# Patient Record
Sex: FEMALE | Race: OTHER | Employment: UNEMPLOYED | URBAN - METROPOLITAN AREA
[De-identification: names, ages, dates, MRNs, and addresses within clinical notes are randomized per-mention and may not be internally consistent; named-entity substitution may affect disease eponyms.]

---

## 2017-12-19 PROCEDURE — 88305 TISSUE EXAM BY PATHOLOGIST: CPT | Performed by: OBSTETRICS & GYNECOLOGY

## 2017-12-19 PROCEDURE — 88175 CYTOPATH C/V AUTO FLUID REDO: CPT | Performed by: OBSTETRICS & GYNECOLOGY

## 2017-12-22 PROCEDURE — 81001 URINALYSIS AUTO W/SCOPE: CPT | Performed by: INTERNAL MEDICINE

## 2018-06-19 PROCEDURE — 87088 URINE BACTERIA CULTURE: CPT | Performed by: INTERNAL MEDICINE

## 2018-06-19 PROCEDURE — 87186 SC STD MICRODIL/AGAR DIL: CPT | Performed by: INTERNAL MEDICINE

## 2018-06-19 PROCEDURE — 87086 URINE CULTURE/COLONY COUNT: CPT | Performed by: INTERNAL MEDICINE

## 2019-07-02 PROCEDURE — 88175 CYTOPATH C/V AUTO FLUID REDO: CPT | Performed by: OBSTETRICS & GYNECOLOGY

## 2019-12-20 PROBLEM — E11.9 TYPE 2 DIABETES MELLITUS WITHOUT COMPLICATION, WITHOUT LONG-TERM CURRENT USE OF INSULIN (HCC): Status: ACTIVE | Noted: 2019-12-20

## 2020-11-23 ENCOUNTER — ANESTHESIA EVENT (OUTPATIENT)
Dept: ENDOSCOPY | Facility: HOSPITAL | Age: 65
DRG: 356 | End: 2020-11-23
Payer: COMMERCIAL

## 2020-11-23 ENCOUNTER — HOSPITAL ENCOUNTER (INPATIENT)
Facility: HOSPITAL | Age: 65
LOS: 5 days | Discharge: HOME | DRG: 356 | End: 2020-11-28
Attending: EMERGENCY MEDICINE | Admitting: INTERNAL MEDICINE
Payer: COMMERCIAL

## 2020-11-23 ENCOUNTER — ANESTHESIA (OUTPATIENT)
Dept: ENDOSCOPY | Facility: HOSPITAL | Age: 65
DRG: 356 | End: 2020-11-23
Payer: COMMERCIAL

## 2020-11-23 DIAGNOSIS — U07.1 COVID-19: ICD-10-CM

## 2020-11-23 DIAGNOSIS — K92.2 GASTROINTESTINAL HEMORRHAGE, UNSPECIFIED GASTROINTESTINAL HEMORRHAGE TYPE: Primary | ICD-10-CM

## 2020-11-23 DIAGNOSIS — D61.818 PANCYTOPENIA (HCC): ICD-10-CM

## 2020-11-23 PROBLEM — K92.1 MELENA: Status: ACTIVE | Noted: 2020-11-23

## 2020-11-23 PROCEDURE — 86901 BLOOD TYPING SEROLOGIC RH(D): CPT | Performed by: EMERGENCY MEDICINE

## 2020-11-23 PROCEDURE — C9113 INJ PANTOPRAZOLE SODIUM, VIA: HCPCS | Performed by: INTERNAL MEDICINE

## 2020-11-23 PROCEDURE — 36415 COLL VENOUS BLD VENIPUNCTURE: CPT

## 2020-11-23 PROCEDURE — 86920 COMPATIBILITY TEST SPIN: CPT

## 2020-11-23 PROCEDURE — 86900 BLOOD TYPING SEROLOGIC ABO: CPT | Performed by: EMERGENCY MEDICINE

## 2020-11-23 PROCEDURE — 99285 EMERGENCY DEPT VISIT HI MDM: CPT

## 2020-11-23 PROCEDURE — 85025 COMPLETE CBC W/AUTO DIFF WBC: CPT | Performed by: INTERNAL MEDICINE

## 2020-11-23 PROCEDURE — 30233N1 TRANSFUSION OF NONAUTOLOGOUS RED BLOOD CELLS INTO PERIPHERAL VEIN, PERCUTANEOUS APPROACH: ICD-10-PCS | Performed by: INTERNAL MEDICINE

## 2020-11-23 PROCEDURE — 86850 RBC ANTIBODY SCREEN: CPT | Performed by: EMERGENCY MEDICINE

## 2020-11-23 PROCEDURE — 80048 BASIC METABOLIC PNL TOTAL CA: CPT | Performed by: EMERGENCY MEDICINE

## 2020-11-23 PROCEDURE — 80076 HEPATIC FUNCTION PANEL: CPT | Performed by: EMERGENCY MEDICINE

## 2020-11-23 PROCEDURE — P9047 ALBUMIN (HUMAN), 25%, 50ML: HCPCS | Performed by: ANESTHESIOLOGY

## 2020-11-23 PROCEDURE — 85025 COMPLETE CBC W/AUTO DIFF WBC: CPT | Performed by: EMERGENCY MEDICINE

## 2020-11-23 PROCEDURE — 36430 TRANSFUSION BLD/BLD COMPNT: CPT | Performed by: ANESTHESIOLOGY

## 2020-11-23 PROCEDURE — P9047 ALBUMIN (HUMAN), 25%, 50ML: HCPCS

## 2020-11-23 PROCEDURE — 93010 ELECTROCARDIOGRAM REPORT: CPT | Performed by: EMERGENCY MEDICINE

## 2020-11-23 PROCEDURE — 93005 ELECTROCARDIOGRAM TRACING: CPT

## 2020-11-23 PROCEDURE — 86769 SARS-COV-2 COVID-19 ANTIBODY: CPT | Performed by: INTERNAL MEDICINE

## 2020-11-23 PROCEDURE — 0DJ08ZZ INSPECTION OF UPPER INTESTINAL TRACT, VIA NATURAL OR ARTIFICIAL OPENING ENDOSCOPIC: ICD-10-PCS | Performed by: INTERNAL MEDICINE

## 2020-11-23 PROCEDURE — 0DJD8ZZ INSPECTION OF LOWER INTESTINAL TRACT, VIA NATURAL OR ARTIFICIAL OPENING ENDOSCOPIC: ICD-10-PCS | Performed by: INTERNAL MEDICINE

## 2020-11-23 RX ORDER — SODIUM CHLORIDE, SODIUM LACTATE, POTASSIUM CHLORIDE, CALCIUM CHLORIDE 600; 310; 30; 20 MG/100ML; MG/100ML; MG/100ML; MG/100ML
INJECTION, SOLUTION INTRAVENOUS CONTINUOUS
Status: DISCONTINUED | OUTPATIENT
Start: 2020-11-23 | End: 2020-11-28

## 2020-11-23 RX ORDER — ONDANSETRON 2 MG/ML
4 INJECTION INTRAMUSCULAR; INTRAVENOUS EVERY 6 HOURS PRN
Status: DISCONTINUED | OUTPATIENT
Start: 2020-11-23 | End: 2020-11-28

## 2020-11-23 RX ORDER — ALBUMIN (HUMAN) 12.5 G/50ML
SOLUTION INTRAVENOUS CONTINUOUS PRN
Status: DISCONTINUED | OUTPATIENT
Start: 2020-11-23 | End: 2020-11-23 | Stop reason: SURG

## 2020-11-23 RX ORDER — ONDANSETRON 2 MG/ML
4 INJECTION INTRAMUSCULAR; INTRAVENOUS ONCE AS NEEDED
Status: ACTIVE | OUTPATIENT
Start: 2020-11-23 | End: 2020-11-23

## 2020-11-23 RX ORDER — HYDROCODONE BITARTRATE AND ACETAMINOPHEN 5; 325 MG/1; MG/1
1 TABLET ORAL AS NEEDED
Status: DISCONTINUED | OUTPATIENT
Start: 2020-11-23 | End: 2020-11-28

## 2020-11-23 RX ORDER — ACETAMINOPHEN 325 MG/1
650 TABLET ORAL EVERY 6 HOURS PRN
Status: DISCONTINUED | OUTPATIENT
Start: 2020-11-23 | End: 2020-11-28

## 2020-11-23 RX ORDER — DEXTROSE MONOHYDRATE 25 G/50ML
50 INJECTION, SOLUTION INTRAVENOUS
Status: DISCONTINUED | OUTPATIENT
Start: 2020-11-23 | End: 2020-11-28

## 2020-11-23 RX ORDER — PROCHLORPERAZINE EDISYLATE 5 MG/ML
5 INJECTION INTRAMUSCULAR; INTRAVENOUS ONCE AS NEEDED
Status: ACTIVE | OUTPATIENT
Start: 2020-11-23 | End: 2020-11-23

## 2020-11-23 RX ORDER — SODIUM CHLORIDE 9 MG/ML
125 INJECTION, SOLUTION INTRAVENOUS CONTINUOUS
Status: DISCONTINUED | OUTPATIENT
Start: 2020-11-23 | End: 2020-11-24

## 2020-11-23 RX ORDER — HYDROCODONE BITARTRATE AND ACETAMINOPHEN 5; 325 MG/1; MG/1
2 TABLET ORAL AS NEEDED
Status: DISCONTINUED | OUTPATIENT
Start: 2020-11-23 | End: 2020-11-28

## 2020-11-23 RX ORDER — ONDANSETRON 2 MG/ML
INJECTION INTRAMUSCULAR; INTRAVENOUS
Status: DISPENSED
Start: 2020-11-23 | End: 2020-11-24

## 2020-11-23 RX ORDER — LIDOCAINE HYDROCHLORIDE 10 MG/ML
INJECTION, SOLUTION EPIDURAL; INFILTRATION; INTRACAUDAL; PERINEURAL AS NEEDED
Status: DISCONTINUED | OUTPATIENT
Start: 2020-11-23 | End: 2020-11-23 | Stop reason: SURG

## 2020-11-23 RX ORDER — SODIUM CHLORIDE 9 MG/ML
INJECTION, SOLUTION INTRAVENOUS ONCE
Status: DISCONTINUED | OUTPATIENT
Start: 2020-11-23 | End: 2020-11-28

## 2020-11-23 RX ORDER — ESMOLOL HYDROCHLORIDE 10 MG/ML
INJECTION INTRAVENOUS AS NEEDED
Status: DISCONTINUED | OUTPATIENT
Start: 2020-11-23 | End: 2020-11-23 | Stop reason: SURG

## 2020-11-23 RX ORDER — SODIUM CHLORIDE, SODIUM LACTATE, POTASSIUM CHLORIDE, CALCIUM CHLORIDE 600; 310; 30; 20 MG/100ML; MG/100ML; MG/100ML; MG/100ML
INJECTION, SOLUTION INTRAVENOUS CONTINUOUS PRN
Status: DISCONTINUED | OUTPATIENT
Start: 2020-11-23 | End: 2020-11-23 | Stop reason: SURG

## 2020-11-23 RX ORDER — SODIUM CHLORIDE 0.9 % (FLUSH) 0.9 %
3 SYRINGE (ML) INJECTION AS NEEDED
Status: DISCONTINUED | OUTPATIENT
Start: 2020-11-23 | End: 2020-11-28

## 2020-11-23 RX ORDER — HYDROMORPHONE HYDROCHLORIDE 1 MG/ML
0.2 INJECTION, SOLUTION INTRAMUSCULAR; INTRAVENOUS; SUBCUTANEOUS EVERY 5 MIN PRN
Status: DISCONTINUED | OUTPATIENT
Start: 2020-11-23 | End: 2020-11-28

## 2020-11-23 RX ORDER — HALOPERIDOL 5 MG/ML
0.25 INJECTION INTRAMUSCULAR ONCE AS NEEDED
Status: ACTIVE | OUTPATIENT
Start: 2020-11-23 | End: 2020-11-23

## 2020-11-23 RX ORDER — NALOXONE HYDROCHLORIDE 0.4 MG/ML
80 INJECTION, SOLUTION INTRAMUSCULAR; INTRAVENOUS; SUBCUTANEOUS AS NEEDED
Status: ACTIVE | OUTPATIENT
Start: 2020-11-23 | End: 2020-11-24

## 2020-11-23 RX ADMIN — SODIUM CHLORIDE, SODIUM LACTATE, POTASSIUM CHLORIDE, CALCIUM CHLORIDE: 600; 310; 30; 20 INJECTION, SOLUTION INTRAVENOUS at 19:05:00

## 2020-11-23 RX ADMIN — LIDOCAINE HYDROCHLORIDE 50 MG: 10 INJECTION, SOLUTION EPIDURAL; INFILTRATION; INTRACAUDAL; PERINEURAL at 18:42:00

## 2020-11-23 RX ADMIN — SODIUM CHLORIDE: 9 INJECTION, SOLUTION INTRAVENOUS at 19:17:00

## 2020-11-23 RX ADMIN — SODIUM CHLORIDE, SODIUM LACTATE, POTASSIUM CHLORIDE, CALCIUM CHLORIDE: 600; 310; 30; 20 INJECTION, SOLUTION INTRAVENOUS at 19:40:00

## 2020-11-23 RX ADMIN — ESMOLOL HYDROCHLORIDE 10 MG: 10 INJECTION INTRAVENOUS at 18:42:00

## 2020-11-23 RX ADMIN — SODIUM CHLORIDE: 9 INJECTION, SOLUTION INTRAVENOUS at 18:50:00

## 2020-11-23 RX ADMIN — SODIUM CHLORIDE: 9 INJECTION, SOLUTION INTRAVENOUS at 21:14:00

## 2020-11-23 RX ADMIN — ALBUMIN (HUMAN): 12.5 SOLUTION INTRAVENOUS at 19:50:00

## 2020-11-23 RX ADMIN — SODIUM CHLORIDE: 9 INJECTION, SOLUTION INTRAVENOUS at 18:47:00

## 2020-11-23 NOTE — PLAN OF CARE
Patient is aox3 resting In bed, bed is low and locked in place, call light is within reach . Patient admitted w/ GI bleed. Frequent bloody stools noted, HR elevated, pt nauseous.  500 cc bolus given, 2 units PRBC ordered, blood consent signed, EGD & colonos

## 2020-11-23 NOTE — ED NOTES
Orders for admission, patient is aware of plan and ready to go upstairs. Any questions, please call ED RN Sybil Angelo  at extension [phone].    Type of COVID test sent:rapid  COVID Suspicion level: Low    Titratable drug(s) infusing:  Rate:    LOC at time of trans

## 2020-11-23 NOTE — ED NOTES
60year old female here with 2 episodes of bloody stool, pt reports feeling dizziness this am after episode

## 2020-11-23 NOTE — ED INITIAL ASSESSMENT (HPI)
Patient aox3 to ed via prviate vehicle patient co of black stool x yesterday with dizziness. +diarrhea denies constipation.

## 2020-11-23 NOTE — ED PROVIDER NOTES
Patient Seen in: [facility] AND [facility] Emergency Department      History   Patient presents with:  GI Bleeding    Stated Complaint: DIZZY, BLOOD IN STOOL    HPI  57-year-old female presenting for evaluation of bloody stools and lightheadedness.   Her symptoms (Oral)   Resp 18   Ht 165.1 cm (5' 5\")   Wt 68 kg   LMP 01/01/2008 (Approximate)   SpO2 98%   BMI 24.96 kg/m²         Physical Exam  Vitals signs and nursing note reviewed. Constitutional:       Appearance: She is well-developed.    HENT:      Head: Norm ------                     CBC W/ DIFFERENTIAL[[id number]]          Abnormal            Final result                 Please view results for these tests on the individual orders. TYPE AND SCREEN    Narrative:      The following orders were created for pan

## 2020-11-23 NOTE — H&P
[facility] Hospitalist H&P       CC: Patient presents with:  GI Bleeding       PCP: Lulu Banda MD    History of Present Illness: Patient is a 60year old female with PMH sig for HL, Tobacco abuse, Menopause Symptoms who presents with lower GI bleed.  Sympto distress   Head:  Normocephalic, without obvious abnormality, atraumatic. Eyes:  Sclera anicteric, No conjunctival pallor, EOMs intact.     Nose: Nares normal.   Throat: Lips, mucosa normal.   Neck: Supple, symmetrical, trachea midline   Lungs:   Clear to records or previous hospital records reviewed. Further recommendations pending patient's clinical course.   [facility] hospitalist to continue to follow patient while in house    Patient and/or patient's family given opportunity to ask questions and note under

## 2020-11-23 NOTE — PLAN OF CARE
Covid +: 11/23  Symptom Onset: asymptomatic  Tmax: Afebrile  O2: RA @ 94%    Monitor Labs  LDH:  Ferritin:  CRP:  DDimer:    Antibiotics: on hold  Blood Thinner: on hold - GI bleed  Decadron: on hold  Actemra: on hold  RDV: on hold  CCP: on hold    ID not

## 2020-11-23 NOTE — CONSULTS
[facility] HOSP - [facility]    Report of Consultation    Corbin Grullon Patient Status:  Inpatient    1955 MRN [id number]   Location [facility]5W Attending Mehnaz Romero MD   Hosp Day # 0 PCP Charleen Donis MD     Date of Admission % injection 3 mL, 3 mL, Intravenous, PRN    •  acetaminophen (TYLENOL) tab 650 mg, 650 mg, Oral, Q6H PRN    •  Pantoprazole Sodium (PROTONIX) 40 mg in Sodium Chloride (PF) 0.9 % 10 mL IV push, 40 mg, Intravenous, Q12H    •  ondansetron HCl (ZOFRAN) 4 MG/2M 24  --    ALT 25  --        No results for input(s): RACHAEL, LIP, GGT, PSA, DDIMER, ESRML, ESRPF, CRP, BNP, TROP, CK, CKMB, TYRESE, RPR, B12, ETOH, POCGLU in the last 168 hours. Invalid input(s): RF     Imaging:  No results found. Impression:   1.   Acute

## 2020-11-24 ENCOUNTER — APPOINTMENT (OUTPATIENT)
Dept: GENERAL RADIOLOGY | Facility: HOSPITAL | Age: 65
DRG: 356 | End: 2020-11-24
Attending: HOSPITALIST
Payer: COMMERCIAL

## 2020-11-24 PROCEDURE — 82728 ASSAY OF FERRITIN: CPT | Performed by: INTERNAL MEDICINE

## 2020-11-24 PROCEDURE — C9113 INJ PANTOPRAZOLE SODIUM, VIA: HCPCS | Performed by: INTERNAL MEDICINE

## 2020-11-24 PROCEDURE — 86140 C-REACTIVE PROTEIN: CPT | Performed by: INTERNAL MEDICINE

## 2020-11-24 PROCEDURE — 81001 URINALYSIS AUTO W/SCOPE: CPT | Performed by: HOSPITALIST

## 2020-11-24 PROCEDURE — 83615 LACTATE (LD) (LDH) ENZYME: CPT | Performed by: INTERNAL MEDICINE

## 2020-11-24 PROCEDURE — 87040 BLOOD CULTURE FOR BACTERIA: CPT | Performed by: HOSPITALIST

## 2020-11-24 PROCEDURE — 85379 FIBRIN DEGRADATION QUANT: CPT | Performed by: INTERNAL MEDICINE

## 2020-11-24 PROCEDURE — 85018 HEMOGLOBIN: CPT | Performed by: HOSPITALIST

## 2020-11-24 PROCEDURE — 85610 PROTHROMBIN TIME: CPT | Performed by: INTERNAL MEDICINE

## 2020-11-24 PROCEDURE — 71045 X-RAY EXAM CHEST 1 VIEW: CPT | Performed by: HOSPITALIST

## 2020-11-24 PROCEDURE — 85060 BLOOD SMEAR INTERPRETATION: CPT | Performed by: INTERNAL MEDICINE

## 2020-11-24 PROCEDURE — 80053 COMPREHEN METABOLIC PANEL: CPT | Performed by: INTERNAL MEDICINE

## 2020-11-24 PROCEDURE — 85025 COMPLETE CBC W/AUTO DIFF WBC: CPT | Performed by: INTERNAL MEDICINE

## 2020-11-24 NOTE — OPERATIVE REPORT
EGD/Colonoscopy Operative Report    Yolanda Grullon Patient Status:  Inpatient    1955 MRN  gastric antrum. The endoscope was then retroflexed to examine the angulus, GE junction, cardia, body and fundus,  then withdrawn to examine the esophagus. The endoscope was then removed from the patient.  The patient tolerated the procedure well with no im as well. 4.   Small internal hemorrhoids. Recommendations:   1. NPO except ice chips  2. Monitor hgb and transfuse as necessary  3. PPI daily  4. If evidence of rebleeding, then STAT CT abdomen/pelvis angiogram recommended.    5.  IR and surge

## 2020-11-24 NOTE — OCCUPATIONAL THERAPY NOTE
OT orders rcvd; discussed with RN- patient is currently on bedrest for hypotension- asked therapy to re-attempt tomorrow.      Leigh Shearer, OTR/L   5 [facility]

## 2020-11-24 NOTE — ANESTHESIA POSTPROCEDURE EVALUATION
Patient: Payton Persaud    Procedure Summary     Date: 11/23/20 Room / Location: [address] [place] [address] ENDOSCOPY 05 / [address] [place] [address] ENDOSCOPY    Anesthesia Start: 0238 Anesthesia Stop: 2120    Procedures:       COLONOSCOPY (N/A )      ESOPHAGOGASTRODUODENOSCOPY (EGD) (N/A ) [place]

## 2020-11-24 NOTE — PAYOR COMM NOTE
--------------  ADMISSION REVIEW     Payor: Bernardino KAPADIA/MARCELLUS  Subscriber #:  [id number]  Authorization Number: [id number]    Admit date: 11/23/20  Admit time: [address]       Admitting Physician: Dre Reed MD  Attending Physician:  Johan Alfredo MD Smokeless tobacco: Never Used      Tobacco comment: Promises to [address]!!! Alcohol use: Yes      Alcohol/week: 0.0 standard drinks      Comment: Very rare wine    Drug use:  No             Review of Systems    Positive for stated complaint: DIZZY, BLOOD All other components within normal limits   RAPID SARS-COV-2 BY PCR - Abnormal; Notable for the following components:    Rapid SARS-CoV-2 by PCR Detected (*)     All other components within normal limits   CBC W/ DIFFERENTIAL - Abnormal; Notable for the f D/w Dr. Guera العراقي for admission and Dr. uCllen Ojeda for GI consultation.   Admission disposition: 11/23/2020 12:07 PM                       Disposition and Plan     Clinical Impression:  Gastrointestinal hemorrhage, unspecified gastrointestinal hemorrhage type  (prima ALL:  No Known Allergies     Home Medications:  No outpatient medications have been marked as taking for the 11/23/20 encounter [facility] Encounter).         Soc Hx  Social History    Tobacco Use      Smoking status: Current Every Day Smoker        Pac No results for input(s): TROP in the last 168 hours. Radiology: No results found. ASSESSMENT / PLAN:   Patient is a 60year old female with PMH sig for HL, Tobacco abuse, Menopause Symptoms who presents with lower GI bleed.     GI bleed  - likely lo Location [facility]5W Attending Lee Taveras MD   Hosp Day # 0 PCP Vidhya Lyman MD      Date of Admission:  11/23/2020  Date of Consult:  11/23/2020  Reason for Consultation:   Anemia, BRBPR, acute gi bleeding     History of Present Illness: •  acetaminophen (TYLENOL) tab 650 mg, 650 mg, Oral, Q6H PRN     •  Pantoprazole Sodium (PROTONIX) 40 mg in Sodium Chloride (PF) 0.9 % 10 mL IV push, 40 mg, Intravenous, Q12H     •  ondansetron HCl (ZOFRAN) 4 MG/2ML injection, , ,      •  ondansetron HCl ( ALT 25  --          No results for input(s): RACHAEL, LIP, GGT, PSA, DDIMER, ESRML, ESRPF, CRP, BNP, TROP, CK, CKMB, TYRESE, RPR, B12, ETOH, POCGLU in the last 168 hours.     Invalid input(s): RF      Imaging:  No results found.      Impression:   1.   Acute GI bl lidocaine PF (XYLOCAINE) 1% injection     Date Action Dose Route User    11/23/2020 1842 Given 50 mg Intravenous Jhoana Armando DO      ondansetron HCl [facility]) injection 4 mg     Date Action Dose Route User    11/23/2020 1540 Given 4 mg Intravenous

## 2020-11-24 NOTE — PLAN OF CARE
Pt A/Ox4. Supplemental oxygen removed, O2 saturation WNL on room air. Pt denies dizziness and lightheadedness. BP stable. LR infusing per orders. Hgb checked Q6. MD Donna Mtz notified of hgh drop from 8.9 to 7.6, continue to monitor.  Dr. Iain Bridges notified of of decreased coronary artery perfusion - ex.  Angina  - Evaluate fluid balance, assess for edema, trend weights  Outcome: Progressing  Goal: Absence of cardiac arrhythmias or at baseline  Description: INTERVENTIONS:  - Continuous cardiac monitoring, monitor ordered and tolerated  - Evaluate effectiveness of GI medications  - Encourage mobilization and activity  - Obtain nutritional consult as needed  - Establish a toileting routine/schedule  - Consider collaborating with pharmacy to review patient's medicatio

## 2020-11-24 NOTE — PROGRESS NOTES
[facility] Hospitalist Progress Note     CC: Hospital Follow up    PCP: Swathi Ferrell MD       Assessment/Plan:     Principal Problem:    Gastrointestinal hemorrhage, unspecified gastrointestinal hemorrhage type  Active Problems:    Melena    COVID-19    Ms. Ludwig night. Denies abdominal pain, dizziness or lightheadedness. OBJECTIVE:    Blood pressure 112/60, pulse 89, temperature 97.2 °F (36.2 °C), temperature source Axillary, resp.  rate 19, height 5' 5\" (1.651 m), weight 150 lb (68 kg), last menstrual period Osteoarthritis. Dictated by (CST): Lokesh Reyes MD on 11/24/2020 at 7:50 AM     Finalized by (CST):  Lokesh Reyes MD on 11/24/2020 at 7:52 AM              Meds:     • pantoprazole (PROTONIX) IV push  40 mg Intravenous Q12H   • sodium chloride

## 2020-11-24 NOTE — ANESTHESIA PROCEDURE NOTES
Peripheral IV        Placement  Needle size: 20 G  Laterality: right  Location: hand  Local anesthetic: none  Site prep: alcohol  Technique: anatomical landmarks

## 2020-11-24 NOTE — PROGRESS NOTES
[facility]  Gastroenterology Progress Note    Otto Aisha Yadi Patient Status:  Inpatient    1955 MRN [id number]   Location [facility] 2W/SW Attending Binu Rowell MD   Hosp Day # 1 PCP Breann Finch MD     Subjective: Zayra Ramsay MD on 11/24/2020 at 7:52 AM             Problem list:  Patient Active Problem List:     Hypercholesterolemia     Tobacco user     Menopausal syndrome     Type 2 diabetes mellitus without complication, without long-term current use of insulin ([facility] 75.) PM

## 2020-11-24 NOTE — PHYSICAL THERAPY NOTE
Pt was to be seen for PT eval. OT consulted w/ RN who said pt is currently on bedrest due to hypotension and lightheadedness w/ activity. Will re-attempt tomorrow if appropriate to see pt.

## 2020-11-24 NOTE — PLAN OF CARE
Received patient from endo at 2200. Patient alert, oriented, and calm. No signs of bleeding. Hypotension noted at 0300, GI MD and hospitalists aware. 500cc bolus of LR given. Patient denies pain. Call light within reach, will continue to monitor.    Problem for changes in mentation and behavior  - Position to facilitate oxygenation and minimize respiratory effort  - Oxygen supplementation based on oxygen saturation or ABGs  - Provide Smoking Cessation handout, if applicable  - Encourage broncho-pulmonary hygi electric shaver for shaving  - Use soft bristle tooth brush  - Limit straining and forceful nose blowing  Outcome: Progressing     Problem: CARDIOVASCULAR - ADULT  Goal: Maintains optimal cardiac output and hemodynamic stability  Description: INTERVENTIONS

## 2020-11-24 NOTE — ANESTHESIA PREPROCEDURE EVALUATION
Anesthesia PreOp Note    HPI:     Tammy Pugh is a 60year old female who presents for preoperative consultation requested by: Damion Christianson MD    Date of Surgery: 11/23/2020    Procedure(s):  COLONOSCOPY  ESOPHAGOGASTRODUODENOSCOPY (EGD)  Ind mouth daily. , Disp: , Rfl: 0, 11/23/2020 at Unknown time        •  0.9% NaCl infusion, 125 mL/hr, Intravenous, Continuous, Dana Horta MD, Last Rate: 125 mL/hr at 11/23/20 1221, 125 mL/hr at 11/23/20 1221    •  Normal Saline Flush 0.9 % injection 3 mL, per session: Not on file      Stress: Not on file    Relationships      Social connections        Talks on phone: Not on file        Gets together: Not on file        Attends [employer] service: Not on file        Active member of club or organization: Not            !HR ! BP (mmHg)  !Sat!   +-----------------------------+---+-----------+---+   ! Standing                     !94 !110/72 (85)!93%! +-----------------------------+---+-----------+---+   ! Regadenoson (Lexiscan); 1 min!97 !114/70 (85)!93%!    +---- negative stress EKG test   for ST segment depression.    SPECT Study:  While at rest the patient received 9.8mCi Tc[99m]-sestamibi   intravenously Gamma camera imaging was performed aproximately 30 minutes   after isotope injection using 180 degree SPECT te °F (37 °C) 98.4 °F (36.9 °C)    TempSrc: Oral Oral Oral    SpO2: 94% 94% 100% 100%   Weight:       Height:            Anesthesia Evaluation     Patient summary reviewed and Nursing notes reviewed    No history of anesthetic complications   Airway   Mallamp

## 2020-11-25 ENCOUNTER — APPOINTMENT (OUTPATIENT)
Dept: CT IMAGING | Facility: HOSPITAL | Age: 65
DRG: 356 | End: 2020-11-25
Attending: INTERNAL MEDICINE
Payer: COMMERCIAL

## 2020-11-25 PROCEDURE — 80053 COMPREHEN METABOLIC PANEL: CPT | Performed by: HOSPITALIST

## 2020-11-25 PROCEDURE — S0028 INJECTION, FAMOTIDINE, 20 MG: HCPCS | Performed by: INTERNAL MEDICINE

## 2020-11-25 PROCEDURE — 74177 CT ABD & PELVIS W/CONTRAST: CPT | Performed by: INTERNAL MEDICINE

## 2020-11-25 PROCEDURE — 97530 THERAPEUTIC ACTIVITIES: CPT

## 2020-11-25 PROCEDURE — 82728 ASSAY OF FERRITIN: CPT | Performed by: HOSPITALIST

## 2020-11-25 PROCEDURE — C9113 INJ PANTOPRAZOLE SODIUM, VIA: HCPCS | Performed by: INTERNAL MEDICINE

## 2020-11-25 PROCEDURE — 86769 SARS-COV-2 COVID-19 ANTIBODY: CPT | Performed by: HOSPITALIST

## 2020-11-25 PROCEDURE — 97161 PT EVAL LOW COMPLEX 20 MIN: CPT

## 2020-11-25 PROCEDURE — 97165 OT EVAL LOW COMPLEX 30 MIN: CPT

## 2020-11-25 PROCEDURE — 86140 C-REACTIVE PROTEIN: CPT | Performed by: HOSPITALIST

## 2020-11-25 PROCEDURE — 83615 LACTATE (LD) (LDH) ENZYME: CPT | Performed by: HOSPITALIST

## 2020-11-25 PROCEDURE — 85379 FIBRIN DEGRADATION QUANT: CPT | Performed by: HOSPITALIST

## 2020-11-25 PROCEDURE — 85018 HEMOGLOBIN: CPT | Performed by: INTERNAL MEDICINE

## 2020-11-25 PROCEDURE — 85018 HEMOGLOBIN: CPT | Performed by: HOSPITALIST

## 2020-11-25 PROCEDURE — 85027 COMPLETE CBC AUTOMATED: CPT | Performed by: HOSPITALIST

## 2020-11-25 RX ORDER — FAMOTIDINE 10 MG/ML
20 INJECTION, SOLUTION INTRAVENOUS 2 TIMES DAILY
Status: DISCONTINUED | OUTPATIENT
Start: 2020-11-25 | End: 2020-11-28

## 2020-11-25 RX ORDER — SODIUM CHLORIDE 9 MG/ML
INJECTION, SOLUTION INTRAVENOUS ONCE
Status: COMPLETED | OUTPATIENT
Start: 2020-11-25 | End: 2020-11-25

## 2020-11-25 NOTE — PLAN OF CARE
Problem: Patient/Family Goals  Goal: Patient/Family Long Term Goal  Description: Patient's Long Term Goal: to feel better    Interventions:  - monitor stool, iv fluids, iv meds  - See additional Care Plan goals for specific interventions  Outcome: Progre or distress noted.       Problem: HEMATOLOGIC - ADULT  Goal: Maintains hematologic stability  Description: INTERVENTIONS  - Assess for signs and symptoms of bleeding or hemorrhage  - Monitor labs and vital signs for trends  - Administer supportive blood pro services based on physician/LIP order or complex needs related to functional status, cognitive ability or social support system  Outcome: Progressing     Problem: HEMATOLOGIC - ADULT  Goal: Free from bleeding injury  Description: (Example usage: patient wi

## 2020-11-25 NOTE — PROGRESS NOTES
[facility] Hospitalist Progress Note     CC: Hospital Follow up    PCP: Bird Sim MD       Assessment/Plan:     Principal Problem:    Gastrointestinal hemorrhage, unspecified gastrointestinal hemorrhage type  Active Problems:    Melena    COVID-19    Ms. Ludwig patient: 35 mins with > 50% spent counseling patient face to face       Subjective:     Hg drop overnight, 6.5, 1 unit PRBC given, no bleeding.  She thinks her COVID test is incorrect/false positive and would like a repeat    OBJECTIVE:    Blood pressure 11 GFRAA 75 113 112   GFRNAA 65 98 97   CA 8.8 7.2* 7.7*    146* 142   K 3.7 3.7 3.7    116* 113*   CO2 24.0 23.0 26.0       Recent Labs   Lab 11/23/20  1102 11/24/20  0529 11/25/20  0616   ALT 25 14 14   AST 24 17 15   ALB 3.4 2.5* 2.5*   LDH

## 2020-11-25 NOTE — OCCUPATIONAL THERAPY NOTE
OCCUPATIONAL THERAPY EVALUATION - INPATIENT      Room Number: 099/845-N  Evaluation Date: 11/25/2020  Type of Evaluation: Initial       Physician Order: IP Consult to Occupational Therapy  Reason for Therapy: ADL/IADL Dysfunction and Discharge Planning techniques;ADL training;Functional transfer training; Endurance training;Patient/Family education;Patient/Family training; Compensatory technique education       OCCUPATIONAL THERAPY MEDICAL/SOCIAL HISTORY     Problem List   Principal Problem:    Sarah Ortega which includes using toilet, bedpan or urinal? : A Little  -   Putting on and taking off regular upper body clothing?: None  -   Taking care of personal grooming such as brushing teeth?: None  -   Eating meals?: None    AM-PAC Score:  Score: 21  Approx Deg

## 2020-11-25 NOTE — PROGRESS NOTES
[facility]  Gastroenterology Progress Note    Zacmichio Erick Yadi Patient Status:  Inpatient    1955 MRN [id number]   Location [facility] 2W/SW Attending Juliet Koehler MD   [facility] Day # 2 PCP Cruz Murrieta MD     Subjective: <0.27 <0.27   CRP 0.59* 0.54*        Imaging:  Xr Chest Ap Portable  (cpt=71045)    Result Date: 11/24/2020  CONCLUSION:  1. Question infiltrate versus scarring in the right mid lung field. 2. Atherosclerosis. 3. Demineralization. 4. Scoliosis.  5. Osteoart other.    Recs:  1. Continue supportive care  2. Monitor hgb levels q 8 hrs for now. Transfuse as necessary  3. CT Abdomen and pelvis today to evaluate for source of bleeding  4. Stop protonix in case this is causing thrombocyopenia (can be seen).    Apollo Christian

## 2020-11-25 NOTE — PHYSICAL THERAPY NOTE
PHYSICAL THERAPY EVALUATION - INPATIENT     Room Number: 206/164-W  Evaluation Date: 11/25/2020  Type of Evaluation: Initial   Physician Order: PT Eval and Treat    Presenting Problem: GI hemorrhage; +COVID 11/23  Reason for Therapy: Mobility Dysfunction training;Family education;Patient education; Energy conservation; Endurance; Body mechanics; Coordination;Balance training;Stair training;Stoop training;Strengthening  Rehab Potential : Good  Frequency (Obs): 3x/week       PHYSICAL THERAPY MEDICAL/SOCIAL HISTO officers. [birth year] yesterday.     PHYSICAL THERAPY EXAMINATION     OBJECTIVE  Precautions: Bed/chair alarm  Fall Risk: Standard fall risk    WEIGHT BEARING RESTRICTION  Weight Bearing Restriction: None                PAIN ASSESSMENT home as soon as possible   Goal #1 Patient is able to demonstrate supine - sit EOB @ level: independent     Goal #1   Current Status    Goal #2 Patient is able to demonstrate transfers Sit to/from Stand at assistance level: independent with none     Goal #

## 2020-11-25 NOTE — CM/SW NOTE
Per nursing rounds pt is from home with her  and independent w/ ADLS, ambulationa and driving. Pt will dc home when medically stable with no home care needs. RN to contact SW/CM if needs arise. Kesha Joshua.  Cayla Meng RN, BSN  Nurse   3

## 2020-11-25 NOTE — PAYOR COMM NOTE
--------------  CONTINUED STAY REVIEW    Payor: [address] [place] [address] KANG/MARCELLUS  Subscriber #:  [id number]  Authorization Number: [id number]    Admit date: 11/23/20  Admit time: 18    Admitting Physician: Leigh Truong MD  Attending Physician:  Brock Gardiner Acute blood loss anemia  - 2 units pRBC on 11/23, 1 unit pRBC 11/25  - s/p EGD/colonsocopy 11/23 without source, noted to have old blood throughout colon and terminal ileum  - GI following, appreciate recs  - continue IVF, NPO, trend hemoglobin Q6hr  - tra Pulse:  [] 93  Resp:  [8-22] 8  BP: ()/(40-64) 111/54        Intake/Output:     Intake/Output Summary (Last 24 hours) at 11/25/2020 0915  Last data filed at 11/25/2020 0700      Gross per 24 hour   Intake 4818.47 ml   Output 2575 ml   Net 2243. CONCLUSION:  1. Question infiltrate versus scarring in the right mid lung field. 2. Atherosclerosis. 3. Demineralization. 4. Scoliosis. 5. Osteoarthritis. Dictated by (CST): Brook Salazar MD on 11/24/2020 at 7:50 AM     Finalized by (CST):  Rosamaria Snell

## 2020-11-25 NOTE — CONSULTS
[facility] Infectious Disease  Report of Consultation    Ova Cranker Nieves-Wojtas Patient Status:  Inpatient    1955 MRN [id number]   Location [facility] 2W/SW Attending Tawana Soares, [address] Day # 2 PCP Daron Reed MD Intravenous, BID  •  Normal Saline Flush 0.9 % injection 3 mL, 3 mL, Intravenous, PRN  •  acetaminophen (TYLENOL) tab 650 mg, 650 mg, Oral, Q6H PRN  •  ondansetron HCl (ZOFRAN) injection 4 mg, 4 mg, Intravenous, Q6H PRN  •  0.9% NaCl infusion, , Intravenou disorder. Remainder of 12 point review of systems otherwise negative.     Vital signs in last 24 hours:  Patient Vitals for the past 24 hrs:   BP Temp Temp src Pulse Resp SpO2   11/25/20 1221 111/56 — — 94 14 96 %   11/25/20 1100 109/65 — — 103 17 95 % murmurs. Abdomen: Soft, NT/ND. Bowel sounds present. No organomegaly. Extremity: No edema. Skin: No rashes or lesions. Neurological: No focal neurologic deficits.     Lab Data Review:  Lab Results   Component Value Date    WBC 2.5 11/25/2020    HG Disease  643 065 300    11/25/2020  1:10 PM

## 2020-11-26 ENCOUNTER — APPOINTMENT (OUTPATIENT)
Dept: CT IMAGING | Facility: HOSPITAL | Age: 65
DRG: 356 | End: 2020-11-26
Attending: INTERNAL MEDICINE
Payer: COMMERCIAL

## 2020-11-26 ENCOUNTER — APPOINTMENT (OUTPATIENT)
Dept: INTERVENTIONAL RADIOLOGY/VASCULAR | Facility: HOSPITAL | Age: 65
DRG: 356 | End: 2020-11-26
Attending: RADIOLOGY
Payer: COMMERCIAL

## 2020-11-26 PROCEDURE — 75774 ARTERY X-RAY EACH VESSEL: CPT

## 2020-11-26 PROCEDURE — 99152 MOD SED SAME PHYS/QHP 5/>YRS: CPT

## 2020-11-26 PROCEDURE — 75726 ARTERY X-RAYS ABDOMEN: CPT

## 2020-11-26 PROCEDURE — 74174 CTA ABD&PLVS W/CONTRAST: CPT | Performed by: INTERNAL MEDICINE

## 2020-11-26 PROCEDURE — S0028 INJECTION, FAMOTIDINE, 20 MG: HCPCS | Performed by: INTERNAL MEDICINE

## 2020-11-26 PROCEDURE — 30233R1 TRANSFUSION OF NONAUTOLOGOUS PLATELETS INTO PERIPHERAL VEIN, PERCUTANEOUS APPROACH: ICD-10-PCS | Performed by: INTERNAL MEDICINE

## 2020-11-26 PROCEDURE — 85060 BLOOD SMEAR INTERPRETATION: CPT | Performed by: HOSPITALIST

## 2020-11-26 PROCEDURE — 85025 COMPLETE CBC W/AUTO DIFF WBC: CPT | Performed by: HOSPITALIST

## 2020-11-26 PROCEDURE — 83615 LACTATE (LD) (LDH) ENZYME: CPT | Performed by: HOSPITALIST

## 2020-11-26 PROCEDURE — 85610 PROTHROMBIN TIME: CPT | Performed by: INTERNAL MEDICINE

## 2020-11-26 PROCEDURE — 85730 THROMBOPLASTIN TIME PARTIAL: CPT | Performed by: SURGERY

## 2020-11-26 PROCEDURE — 85379 FIBRIN DEGRADATION QUANT: CPT | Performed by: HOSPITALIST

## 2020-11-26 PROCEDURE — 82728 ASSAY OF FERRITIN: CPT | Performed by: HOSPITALIST

## 2020-11-26 PROCEDURE — 99153 MOD SED SAME PHYS/QHP EA: CPT

## 2020-11-26 PROCEDURE — 85018 HEMOGLOBIN: CPT | Performed by: INTERNAL MEDICINE

## 2020-11-26 PROCEDURE — 86140 C-REACTIVE PROTEIN: CPT | Performed by: HOSPITALIST

## 2020-11-26 PROCEDURE — 04L53DZ OCCLUSION OF SUPERIOR MESENTERIC ARTERY WITH INTRALUMINAL DEVICE, PERCUTANEOUS APPROACH: ICD-10-PCS | Performed by: RADIOLOGY

## 2020-11-26 PROCEDURE — 37244 VASC EMBOLIZE/OCCLUDE BLEED: CPT

## 2020-11-26 PROCEDURE — 36247 INS CATH ABD/L-EXT ART 3RD: CPT

## 2020-11-26 PROCEDURE — 86927 PLASMA FRESH FROZEN: CPT

## 2020-11-26 PROCEDURE — 30233K1 TRANSFUSION OF NONAUTOLOGOUS FROZEN PLASMA INTO PERIPHERAL VEIN, PERCUTANEOUS APPROACH: ICD-10-PCS | Performed by: HOSPITALIST

## 2020-11-26 PROCEDURE — 80053 COMPREHEN METABOLIC PANEL: CPT | Performed by: HOSPITALIST

## 2020-11-26 PROCEDURE — B4141ZZ FLUOROSCOPY OF SUPERIOR MESENTERIC ARTERY USING LOW OSMOLAR CONTRAST: ICD-10-PCS | Performed by: RADIOLOGY

## 2020-11-26 RX ORDER — LIDOCAINE HYDROCHLORIDE 20 MG/ML
INJECTION, SOLUTION EPIDURAL; INFILTRATION; INTRACAUDAL; PERINEURAL
Status: COMPLETED
Start: 2020-11-26 | End: 2020-11-26

## 2020-11-26 RX ORDER — SODIUM CHLORIDE 9 MG/ML
INJECTION, SOLUTION INTRAVENOUS ONCE
Status: COMPLETED | OUTPATIENT
Start: 2020-11-26 | End: 2020-11-26

## 2020-11-26 RX ORDER — MIDAZOLAM HYDROCHLORIDE 1 MG/ML
INJECTION INTRAMUSCULAR; INTRAVENOUS
Status: COMPLETED
Start: 2020-11-26 | End: 2020-11-26

## 2020-11-26 RX ORDER — POTASSIUM CHLORIDE 1.5 G/1.77G
40 POWDER, FOR SOLUTION ORAL EVERY 4 HOURS
Status: DISCONTINUED | OUTPATIENT
Start: 2020-11-26 | End: 2020-11-26

## 2020-11-26 NOTE — PROCEDURES
[facility]  Procedure Note    Wyatt Granados Patient Status:  Inpatient    1955 MRN [id number]   Location [facility] Attending Jefe Calles MD   [facility] Day # 3 PCP Jennifer Ewing MD     Proce

## 2020-11-26 NOTE — PROGRESS NOTES
Jairon Webb is a 63year old female. Patient presents with:  GI Bleeding      HPI:    Active bleeding and going for ct scan  Now on o2 at 2L    REVIEW OF SYSTEMS:   A comprehensive 11 point review of systems was completed.   Pertinent positives If o2 dependence continues we can consider remdesivir and ccp. Not sure about steroids given the ongoing bleeding issues. Discussed with staff         Lab Results   Component Value Date    WBC 1.9 11/26/2020    HGB 6.0 11/26/2020    HCT 18.3 11/26/2020    P mg/dL    Creatinine 0.58 0.55 - 1.02 mg/dL    BUN/CREA Ratio 32.8 (H) 10.0 - 20.0    Calcium, Total 7.2 (L) 8.5 - 10.1 mg/dL    Calculated Osmolality 303 (H) 275 - 295 mOsm/kg    GFR, Non- 98 >=60    GFR, -American 113 >=60    ALT 14 space-occupying lesions of the bone marrow, and some neoplastic conditions. Other possible causes of this type of anemia may include hemorrhage/hemolysis, chronic inflammatory disorders, neoplasms, and endorgan failure. Dusty Beltran M.D.       HEMOGLOBIN ug/mL FEU   LDH   Result Value Ref Range     84 - 246 U/L   HEMOGLOBIN   Result Value Ref Range    HGB 7.1 (L) 12.0 - 16.0 g/dL   COMP METABOLIC PANEL (14)   Result Value Ref Range    Glucose 120 (H) 70 - 99 mg/dL    Sodium 141 136 - 145 mmol/L    P Product Status Presumed Transfused     Expiration Date [id number]     Blood Type Barcode 6200    PREPARE RBC   Result Value Ref Range    Blood Product [id number]     Unit Number [id number]     UNIT ABO A     UNIT RH POS     Product Status Issued 9.7 %    Eosinophil % 0.0 %    Basophil % 0.2 %    Immature Granulocyte % 0.5 %   CBC W/ DIFFERENTIAL   Result Value Ref Range    WBC 5.1 4.0 - 11.0 x10(3) uL    RBC 2.54 (L) 3.80 - 5.30 x10(6)uL    HGB 7.3 (L) 12.0 - 16.0 g/dL    HCT 23.0 (L) 35.0 - 48.0 - 16.0 g/dL    HCT 23.2 (L) 35.0 - 48.0 %    MCV 90.6 80.0 - 100.0 fL    MCH 29.7 26.0 - 34.0 pg    MCHC 32.8 31.0 - 37.0 g/dL    RDW-SD 46.5 (H) 35.1 - 46.3 fL    RDW 14.3 11.0 - 15.0 %    PLT 91.0 (L) 150.0 - 450.0 10(3)uL    Neutrophil Absolute Prelim 0

## 2020-11-26 NOTE — CONSULTS
[facility]  Report of Consultation    Juventino Espinoza Patient Status:  Inpatient    1955 MRN [id number]   Location [facility] 2W/SW Attending Ignacio Costa MD   [address] Day # 3 PCP Cornel Dalal MD     [address] Heart Disease Mother               reports that she has been smoking. She started smoking about 40 years ago. She has been smoking about 0.50 packs per day. She has never used smokeless tobacco. She reports current alcohol use.  She reports that she currently breastfeeding.       PEX deferred due to COVID19+ and preservation of PPE       DIAGNOSTIC WORK UP:     Laboratory Data:      Recent Results (from the past 24 hour(s))   HEMOGLOBIN    Collection Time: 11/25/20  8:08 PM   Result Value Ref Range x10(6)uL    HGB 6.0 (LL) 12.0 - 16.0 g/dL    HCT 18.3 (L) 35.0 - 48.0 %    MCV 90.6 80.0 - 100.0 fL    MCH 29.7 26.0 - 34.0 pg    MCHC 32.8 31.0 - 37.0 g/dL    RDW-SD 48.0 (H) 35.1 - 46.3 fL    RDW 14.7 11.0 - 15.0 %    PLT 90.0 (L) 150.0 - 450.0 10(3)uL 11/26/20  1245   RBC 2.96* 2.56*  --  2.59*  --   --  2.02*  --    HGB 8.9* 7.6*   < > 7.7*  7.7*   < > 7.1* 6.0* 7.3*   HCT 26.7* 23.2*  --  22.8*  --   --  18.3*  --    MCV 90.2 90.6  --  88.0  --   --  90.6  --    MCH 30.1 29.7  --  29.7  --   --  29.7 PM          Xr Chest Ap Portable  (cpt=71045)    Result Date: 11/24/2020  CONCLUSION:  1. Question infiltrate versus scarring in the right mid lung field. 2. Atherosclerosis. 3. Demineralization. 4. Scoliosis. 5. Osteoarthritis. Dictated by (CST):  Jessa Urbina

## 2020-11-26 NOTE — PLAN OF CARE
Problem: Patient Centered Care  Goal: Patient preferences are identified and integrated in the patient's plan of care  Description: Interventions:  - What would you like us to know as we care for you?  Im from home with   - Provide timely, complete medications as ordered  - Initiate emergency measures for life threatening arrhythmias  - Monitor electrolytes and administer replacement therapy as ordered  Outcome: Progressing     Problem: RESPIRATORY - ADULT  Goal: Achieves optimal ventilation and oxyg INTERVENTIONS  - Assess for signs and symptoms of bleeding or hemorrhage  - Monitor labs and vital signs for trends  - Administer supportive blood products/factors, fluids and medications as ordered and appropriate  - Administer supportive blood products/f for interpreters to assist at discharge as needed  - Consider post-discharge preferences of patient/family/discharge partner  - Complete POLST form as appropriate  - Assess patient's ability to be responsible for managing their own health  - Refer to Case

## 2020-11-26 NOTE — PROGRESS NOTES
[facility]  Gastroenterology Progress Note    Roma Grullon Patient Status:  Inpatient    1955 MRN [id number]   Location [facility] 2W/SW Attending Surendra Frey MD   [facility] Day # 3 PCP Jenaro García MD     Subjective: Areas of parenchymal scarring and/or subsegmental atelectasis at the visualized lung bases. Given the patient's history provided above, these findings could represent chronic sequela of prior COVID-19 pneumonia. 3. Cholelithiasis.  4. Nonobstructing subcen sided diverticulosis without obvious bleeding lesion at the time of the scope. -s/p now 3 units prbc thru yesterday. Receiving her 4th unit today  -Now with recurrent bleeding and hemodynamic instability.       3.  COVID positive  -not on any treatmen

## 2020-11-26 NOTE — CONSULTS
[address]  SXJ:[id number]/[id number]  [facility]:[id number]  LOS:3    Date of Admission:  11/23/2020  Date of Consult:  11/26/ Past Surgical History:   Procedure Laterality Date   • COLONOSCOPY N/A 11/23/2020    Performed by Whit Foley MD at [facility] ENDOSCOPY   • CYST ASPIRATION LEFT  2010   • ESOPHAGOGASTRODUODENOSCOPY (EGD) N/A 11/23/2020    Performed by Whit Foley MD PRN  •  HYDROmorphone HCl (DILAUDID) 1 MG/ML injection 0.2 mg, 0.2 mg, Intravenous, Q5 Min PRN  •  0.9% NaCl infusion, , Intravenous, Once    Review of Systems:   Pertinent items are noted in HPI.   Constitutional: negative  Eyes: negative  Ears, nose, mout 112 109   GFRNAA 98 97 95   CA 7.2* 7.7* 7.7*   * 142 141   K 3.7 3.7 3.5   * 113* 111   CO2 23.0 26.0 27.0       Lab Results   Component Value Date    WBC 1.9 11/26/2020    HGB 6.0 11/26/2020    HCT 18.3 11/26/2020    PLT 90.0 11/26/2020    NA spent on counseling/coordination of care:  1 Hour  Total time spent with patient:  1 Hour 15 Minutes

## 2020-11-26 NOTE — PRE-SEDATION ASSESSMENT
[facility]  IR Pre-Procedure Sedation Assessment    History of snoring or sleep or apnea?    No    History of previous problems with anesthesia or sedation  No    Physical Findings:  Neck: nl ROM  CV: RRR  PULM: normal respiratory rate/effor

## 2020-11-26 NOTE — DIETARY NOTE
Brief Nutrition Note:    Chart review due to COVID +. Appears that pt is asymptomatic and is suspected that pt has a false positive result. MD to check COVID antibodies. Pt admitted with GI bleed. Bloody BM this am, going for a CT scan. NPO/Cl x3 days.  Ca
none

## 2020-11-26 NOTE — PLAN OF CARE
Problem: Patient Centered Care  Goal: Patient preferences are identified and integrated in the patient's plan of care  Description: Interventions:  - What would you like us to know as we care for you?  Im from home with   - Provide timely, complete Evaluate effectiveness of antiarrhythmic and heart rate control medications as ordered  - Initiate emergency measures for life threatening arrhythmias  - Monitor electrolytes and administer replacement therapy as ordered  Outcome: Progressing     Problem: profile  Outcome: Progressing  Note: No bloody stools overnight       Problem: HEMATOLOGIC - ADULT  Goal: Maintains hematologic stability  Description: INTERVENTIONS  - Assess for signs and symptoms of bleeding or hemorrhage  - Monitor labs and vital signs to discharge w/pt and caregiver  - Include patient/family/discharge partner in discharge planning  - Arrange for needed discharge resources and transportation as appropriate  - Identify discharge learning needs (meds, wound care, etc)  - Arrange for interp

## 2020-11-26 NOTE — PROGRESS NOTES
[facility] Hospitalist Progress Note     CC: Hospital Follow up    PCP: Whit Tidwell MD       Assessment/Plan:     Principal Problem:    Gastrointestinal hemorrhage, unspecified gastrointestinal hemorrhage type  Active Problems:    Melena    COVID-19    Ms. Ludwig given opportunity to ask questions and note understanding and agreeing with therapeutic plan as outlined     Fabiola MD Wili  11/26/20  [facility] Hospitalist  Answering service: [phone]      Time spent with patient: 35 mins with > 50% spent counseling p --  14.4  --   --  14.7  --    NEPRELIM 1.91 0.97*  --   --   --   --  0.46*  --    WBC 4.0 3.4*  --  2.5*  --   --  1.9*  --    .0* 91.0*  --  82.0*  --   --  90.0*  --     < > = values in this interval not displayed.          Recent Labs   Lab 11/2 2133     Normal Saline Flush, acetaminophen, ondansetron HCl, glucose **OR** Glucose-Vitamin C **OR** dextrose **OR** glucose **OR** Glucose-Vitamin C, HYDROcodone-acetaminophen **OR** HYDROcodone-acetaminophen, fentaNYL citrate, HYDROmorphone HCl

## 2020-11-27 PROCEDURE — 83615 LACTATE (LD) (LDH) ENZYME: CPT | Performed by: HOSPITALIST

## 2020-11-27 PROCEDURE — 86140 C-REACTIVE PROTEIN: CPT | Performed by: HOSPITALIST

## 2020-11-27 PROCEDURE — 86850 RBC ANTIBODY SCREEN: CPT | Performed by: INTERNAL MEDICINE

## 2020-11-27 PROCEDURE — 82728 ASSAY OF FERRITIN: CPT | Performed by: HOSPITALIST

## 2020-11-27 PROCEDURE — 85018 HEMOGLOBIN: CPT | Performed by: INTERNAL MEDICINE

## 2020-11-27 PROCEDURE — 84145 PROCALCITONIN (PCT): CPT | Performed by: NURSE PRACTITIONER

## 2020-11-27 PROCEDURE — S0028 INJECTION, FAMOTIDINE, 20 MG: HCPCS | Performed by: INTERNAL MEDICINE

## 2020-11-27 PROCEDURE — 86901 BLOOD TYPING SEROLOGIC RH(D): CPT | Performed by: INTERNAL MEDICINE

## 2020-11-27 PROCEDURE — 85018 HEMOGLOBIN: CPT | Performed by: HOSPITALIST

## 2020-11-27 PROCEDURE — S0028 INJECTION, FAMOTIDINE, 20 MG: HCPCS | Performed by: HOSPITALIST

## 2020-11-27 PROCEDURE — 80053 COMPREHEN METABOLIC PANEL: CPT | Performed by: HOSPITALIST

## 2020-11-27 PROCEDURE — 85379 FIBRIN DEGRADATION QUANT: CPT | Performed by: HOSPITALIST

## 2020-11-27 PROCEDURE — 86900 BLOOD TYPING SEROLOGIC ABO: CPT | Performed by: INTERNAL MEDICINE

## 2020-11-27 PROCEDURE — 97116 GAIT TRAINING THERAPY: CPT

## 2020-11-27 PROCEDURE — 97530 THERAPEUTIC ACTIVITIES: CPT

## 2020-11-27 PROCEDURE — 85025 COMPLETE CBC W/AUTO DIFF WBC: CPT | Performed by: HOSPITALIST

## 2020-11-27 NOTE — PAYOR COMM NOTE
--------------  CONTINUED STAY REVIEW-------REQUESTING ADDITIONAL DAY 11/27      Payor: Sanaz KAPADIA/MARCELLUS  Subscriber #:  [id number]  Authorization Number: [id number]    Admit date: 11/23/20  Admit time: [address] [facility]    Admitting Physician: Francesca Hamilton MD  At    Objective:        11/27/20  0300 11/27/20  0400 11/27/20  0500 11/27/20  0600   BP: 114/55 119/60 119/62 105/48   Pulse: 81 84 83 86   Resp: 19 18 18 21   Temp:   98.8 °F (37.1 °C)       TempSrc:   Axillary       SpO2: 94% 94% 94% 95%   Weight:         PLT 91.0*  --  82.0*  --  90.0*  --   --  138.0*    < > = values in this interval not displayed.               Recent Labs   Lab 11/25/20  0616 11/26/20  0444 11/27/20  0351   GLU 86 120* 82   BUN 17 16 9   CREATSERUM 0.60 0.65 0.60   GFRAA 112 109 112   G 11/26/2020 2005 New Bag 3.375 g Intravenous Jovi Mcpherson RN    11/26/2020 1219 New Bag 3.375 g Intravenous Zoey Neal RN      potassium chloride 40 mEq in sodium chloride 0.9% 250 mL IVPB     Date Action Dose Route User    11/26/2020 1229 N

## 2020-11-27 NOTE — PHYSICAL THERAPY NOTE
PHYSICAL THERAPY TREATMENT NOTE - INPATIENT     Room Number: 802/918-Q       Presenting Problem: GI hemorrhage; +COVID 11/23    Problem List  Principal Problem:    Gastrointestinal hemorrhage, unspecified gastrointestinal hemorrhage type  Active Problems: ASSESSMENT   Ratin    BALANCE                                                                                                                     Static Sitting: Good  Dynamic Sitting: Good           Static Standing: Good  Dynamic Standing: Good    ACT demonstrate transfers Sit to/from Stand at assistance level: independent with none      Goal #2  Current Status Pt performs tx at supervision    Goal #3 Patient is able to ambulate 300 feet with assist device: none at assistance level: independent   Goal #

## 2020-11-27 NOTE — OCCUPATIONAL THERAPY NOTE
OCCUPATIONAL THERAPY TREATMENT NOTE - INPATIENT        Room Number: 629/816-R    Problem List  Principal Problem:    Gastrointestinal hemorrhage, unspecified gastrointestinal hemorrhage type  Active Problems:    Melena    COVID-19      OCCUPATIONAL THERAPY washing, rinsing, drying)?: A Little  -   Toileting, which includes using toilet, bedpan or urinal? : A Little  -   Putting on and taking off regular upper body clothing?: None  -   Taking care of personal grooming such as brushing teeth?: None  -   Eating

## 2020-11-27 NOTE — PROGRESS NOTES
Double RN skin check done prior to transfer off Unit.  Skin check performed by this RN and Mica Adkins RN     Wounds are as follows: None    Will remain available for any further questions or concerns

## 2020-11-27 NOTE — PLAN OF CARE
Pt states that she is feeling better. IV abx and IVF maintained. Up in the chair this morning. Pt had one maroon colored stool today. GI aware. VSS.  Ok to transfer to floor per MD.     Problem: Patient Centered Care  Goal: Patient preferences are identifie

## 2020-11-27 NOTE — PLAN OF CARE
Problem: HEMATOLOGIC - ADULT  Goal: Maintains hematologic stability  Description: INTERVENTIONS  - Assess for signs and symptoms of bleeding or hemorrhage  - Monitor labs and vital signs for trends  - Administer supportive blood products/factors, fluids

## 2020-11-27 NOTE — PROGRESS NOTES
[facility]  Progress Note    Glenn AriasNeojvamsi Patient Status:  Inpatient    1955 MRN [id number]   Location [facility] 2W/SW Attending Sandi Balbuena MD   [facility] Day # 4 PCP Cruz Murrieta MD     Subjective:    SMA angiogram reve Barcode [address] [facility] PLASMA    Collection Time: 11/27/20 12:40 AM   Result Value Ref Range    Blood Product [id number]     Unit Number [id number]     UNIT ABO A     UNIT RH POS     Product Status Presumed Transfused     Expiration Date 2 12.0 - 16.0 g/dL    HCT 23.7 (L) 35.0 - 48.0 %    MCV 88.8 80.0 - 100.0 fL    MCH 30.0 26.0 - 34.0 pg    MCHC 33.8 31.0 - 37.0 g/dL    RDW-SD 45.9 35.1 - 46.3 fL    RDW 14.4 11.0 - 15.0 %    .0 (L) 150.0 - 450.0 10(3)uL    Neutrophil Absolute Prelim Abd/pel (vwu=[id number])    Result Date: 11/26/2020  CONCLUSION:   1.   There is a blush of arterial phase contrast extending into the lumen of the proximal small bowel in the left upper quadrant which is not seen on the precontrast imaging and is a possible sit

## 2020-11-27 NOTE — PROGRESS NOTES
[facility]    General Surgery Progress Note  Alicia SHORE:[id number]  # 4       Subjective:   Feels better denies abdominal pain and denies N/V  Passing flatus, no BM, no bleeding    Exam:     General: awake and alert, in no (IVM=[id number])    Result Date: 11/26/2020  CONCLUSION:   1.   There is a blush of arterial phase contrast extending into the lumen of the proximal small bowel in the left upper quadrant which is not seen on the precontrast imaging and is a possible site of pat CO2 27.0 11/27/2020    BUN 9 11/27/2020    CREATSERUM 0.60 11/27/2020    GLU 82 11/27/2020    BILT 0.6 11/27/2020    AST 18 11/27/2020     11/27/2020    ALT 16 11/27/2020    PTT 30.9 11/26/2020    CA 7.8 11/27/2020    ALB 2.8 11/27/2020    TP 5.5 11

## 2020-11-27 NOTE — PROGRESS NOTES
[facility] Infectious Disease Progress Note    Rozina Saavedra Patient Status:  Inpatient    1955 MRN [id number]   Location [facility] 2W/SW Attending Ronnie Torres MD   [facility] Day # 4 PCP MD Dorothy Arriola currently breastfeeding. Temp (24hrs), Av.4 °F (36.9 °C), Min:97.2 °F (36.2 °C), Max:98.9 °F (37.2 °C)    PE deferred to save PPE.     Labs:  Lab Results   Component Value Date    WBC 2.7 2020    HGB 8.0 2020    HCT 23.7 2020    PLT

## 2020-11-27 NOTE — PROGRESS NOTES
[facility]  Gastroenterology Progress Note    Toshia Karissa Yadi Patient Status:  Inpatient    1955 MRN [id number]   Location [facility] 2W/SW Attending Nancy Lopez MD   [facility] Day # 4 PCP Sal Latham MD     Subjective: Abdomen+pelvis(contrast Only)(cpt=74177)    Result Date: 11/25/2020  CONCLUSION:  1. No acute intra-abdominal process. 2. Areas of parenchymal scarring and/or subsegmental atelectasis at the visualized lung bases.   Given the patient's history provided abo embolization yesterday. -s/p EGD 11/23/20 with only mild gastritis. No blood or obvious bleeding source identified.   -Colonoscopy 11/23/20 with old blood noted throughout the entire colon.  This limited visualization, however no active bleeding was not

## 2020-11-27 NOTE — PROGRESS NOTES
[facility] Hospitalist Progress Note     CC: Hospital Follow up    PCP: Lulu Banda MD       Assessment/Plan:     Principal Problem:    Gastrointestinal hemorrhage, unspecified gastrointestinal hemorrhage type  Active Problems:    Melena    COVID-19    Ms. Luwdig 11/26/20, will trend   - per ID Zosyn added for leukopenia, narrow when able , d/w ID, hopefully d.c mark     HL  - hold statin while npo     Menopause Symptoms  - hold estradiol/medroxiprogesrone      Tobacco abuse  - still smokes 1/2 pack per day  - advi Lab 11/24/20  0529 11/24/20  0529 11/25/20  0616 11/25/20  0616 11/26/20  0444 11/26/20  0444 11/26/20 2015 11/27/20  0351 11/27/20  1206   RBC 2.56*  --  2.59*  --  2.02*  --   --  2.67*  --    HGB 7.6*   < > 7.7*  7.7*   < > 6.0*   < > 6.9* 8.0* 8.4* lumen of the proximal small bowel in the left upper quadrant which is not seen on the precontrast imaging and is a possible site of patient's GI bleeding.   Assessment of the colon is limited as there is oral contrast already in the lumen of the colon from

## 2020-11-28 ENCOUNTER — APPOINTMENT (OUTPATIENT)
Dept: CT IMAGING | Facility: HOSPITAL | Age: 65
DRG: 356 | End: 2020-11-28
Attending: SURGERY
Payer: COMMERCIAL

## 2020-11-28 VITALS
TEMPERATURE: 98 F | WEIGHT: 150 LBS | OXYGEN SATURATION: 93 % | RESPIRATION RATE: 16 BRPM | SYSTOLIC BLOOD PRESSURE: 124 MMHG | HEART RATE: 91 BPM | BODY MASS INDEX: 24.99 KG/M2 | HEIGHT: 65 IN | DIASTOLIC BLOOD PRESSURE: 59 MMHG

## 2020-11-28 PROCEDURE — S0028 INJECTION, FAMOTIDINE, 20 MG: HCPCS | Performed by: HOSPITALIST

## 2020-11-28 PROCEDURE — 85610 PROTHROMBIN TIME: CPT | Performed by: HOSPITALIST

## 2020-11-28 PROCEDURE — 74177 CT ABD & PELVIS W/CONTRAST: CPT | Performed by: SURGERY

## 2020-11-28 PROCEDURE — 80048 BASIC METABOLIC PNL TOTAL CA: CPT | Performed by: HOSPITALIST

## 2020-11-28 PROCEDURE — 83735 ASSAY OF MAGNESIUM: CPT | Performed by: HOSPITALIST

## 2020-11-28 PROCEDURE — 84100 ASSAY OF PHOSPHORUS: CPT | Performed by: HOSPITALIST

## 2020-11-28 PROCEDURE — 83615 LACTATE (LD) (LDH) ENZYME: CPT | Performed by: HOSPITALIST

## 2020-11-28 PROCEDURE — 85025 COMPLETE CBC W/AUTO DIFF WBC: CPT | Performed by: HOSPITALIST

## 2020-11-28 RX ORDER — FAMOTIDINE 20 MG/1
20 TABLET ORAL 2 TIMES DAILY
Qty: 60 TABLET | Refills: 0 | Status: SHIPPED | OUTPATIENT
Start: 2020-11-28 | End: 2021-06-28 | Stop reason: ALTCHOICE

## 2020-11-28 RX ORDER — ACETAMINOPHEN 325 MG/1
650 TABLET ORAL EVERY 6 HOURS PRN
Qty: 30 TABLET | Refills: 0 | Status: ON HOLD | OUTPATIENT
Start: 2020-11-28 | End: 2020-12-21 | Stop reason: CLARIF

## 2020-11-28 NOTE — PLAN OF CARE
Patient Aox4, on RA. O2 saturation >90% at rest. Patient getting LR at 83ml/hr and tolerating well. Vital signs stable, will continue to monitor.   Problem: Patient Centered Care  Goal: Patient preferences are identified and integrated in the patient's plan Progressing  Goal: Absence of cardiac arrhythmias or at baseline  Description: INTERVENTIONS:  - Continuous cardiac monitoring, monitor vital signs, obtain 12 lead EKG if indicated  - Evaluate effectiveness of antiarrhythmic and heart rate control medicati consult as needed  - Establish a toileting routine/schedule  - Consider collaborating with pharmacy to review patient's medication profile  Outcome: Progressing     Problem: HEMATOLOGIC - ADULT  Goal: Maintains hematologic stability  Description: INTERVENT to discharge w/pt and caregiver  - Include patient/family/discharge partner in discharge planning  - Arrange for needed discharge resources and transportation as appropriate  - Identify discharge learning needs (meds, wound care, etc)  - Arrange for interp

## 2020-11-28 NOTE — PLAN OF CARE
Problem: CARDIOVASCULAR - ADULT  Goal: Maintains optimal cardiac output and hemodynamic stability  Description: INTERVENTIONS:  - Monitor vital signs, rhythm, and trends  - Monitor for bleeding, hypotension and signs of decreased cardiac output  - Evalua hydration with IV or PO as ordered and tolerated  - Nasogastric tube to low intermittent suction as ordered  - Evaluate effectiveness of ordered antiemetic medications  - Provide nonpharmacologic comfort measures as appropriate  - Advance diet as tolerated Assess pt frequently for physical needs  - Identify cognitive and physical deficits and behaviors that affect risk of falls.   - [place] fall precautions as indicated by assessment.  - Educate pt/family on patient safety including physical limitations  -

## 2020-11-28 NOTE — DISCHARGE SUMMARY
[facility] Internal Medicine Discharge Summary   Patient ID:  Carroll Villegas  [id number]  9 year old  11/24/1955    Admit date: 11/23/2020    Discharge date and time:  11/28/20    Attending Physician: Sarah Garcia MD     Primary Care Physician: Dameon Carroll BNBN.  No diarrhea but her stool has been loose. She denies any abd pain, CP, SOB, cough, f/c or any sick contacts. She has been home for the last week and her  is not sick either.      Donna Bolanos  Ms. Grullon is a 60year old femal LWABDIRAHMAN, likely consumptive, WBC improving.   Plts increased but got transfused on 11/26/20 and continues to improve  -zosyn stopped , pt afebrile, hypoxia transient, clinically improving     HL  - resume statin      Menopause Symptoms  - hold estradiol/medr No defined mass or abnormal enlargement. KIDNEYS:   There is a 4.1 cm exophytic simple cortical cyst arising from the superior right renal pole posteriorly. A 6 mm corticomedullary junction stone is also seen in the superior right renal pole.   The jakub and pelvis were obtained without and with non-ionic intravenous contrast material.  Multi-planar reformatted and 3-D images were created with vessel analysis performed on an independent workstation. Automated exposure control for dose reduction was used. circumferential gallbladder wall thickening which is unchanged without over distention of the gallbladder or acute inflammatory process to suggest acute cholecystitis. This could be related to underlying adenomyomatosis, unclear.   ADRENALS: Normal.  No ma CONCLUSION:   1. There is a blush of arterial phase contrast extending into the lumen of the proximal small bowel in the left upper quadrant which is not seen on the precontrast imaging and is a possible site of patient's GI bleeding.   Assessment of t jejunal artery branch angiogram with coil embolization INDICATION: 31-year-old Covid-19+ woman with acute intestinal hemorrhage resulting in anemia and hypotension. CT angiogram reveals active extravasation within the proximal jejunum.  :  Bárbara the proximal jejunum. Superselective catheterization of the third order jejunal branch was then performed with a 2.4 Western Chiara straight Maestro microcatheter and Headliner 90 microwire.   Superselective jejunal branch angiography demonstrates perfusion of hyp questions and state understand and agree with therapeutic plan as outlined

## 2020-11-28 NOTE — PROGRESS NOTES
[facility]  Progress Note    Bethany Hammondsanmol Grullon Patient Status:  Inpatient    1955 MRN [id number]   Location [facility] 2W/SW Attending Lianet Ramirez MD   [facility] Day # 5 PCP Burgess Mitesh MD     Subjective:    No new events note Collection Time: 11/28/20  5:31 AM   Result Value Ref Range    Magnesium 2.2 1.6 - 2.6 mg/dL   PHOSPHORUS    Collection Time: 11/28/20  5:31 AM   Result Value Ref Range    Phosphorus 3.3 2.5 - 4.9 mg/dL   PROTHROMBIN TIME (PT)    Collection Time: 11/28/20 23.7*  --  26.1*   MCV 90.6  --  88.8  --  90.0   MCH 29.7  --  30.0  --  29.7   MCHC 32.8  --  33.8  --  33.0   RDW 14.7  --  14.4  --  14.7   NEPRELIM 0.46*  --  0.71*  --  0.83*   WBC 1.9*  --  2.7*  --  3.2*   PLT 90.0*  --  138.0*  --  164.0    < > =

## 2020-11-28 NOTE — PROGRESS NOTES
[facility] Infectious Disease Progress Note    Tammy Pugh Patient Status:  Inpatient    1955 MRN [id number]   Location [facility] 2W/SW Attending Becky Jasso MD   [facility] Day # 5 PCP MD Fredy Arzola Max:98.9 °F (37.2 °C)    PE deferred to save PPE.       Labs:  Lab Results   Component Value Date    WBC 3.2 11/28/2020    HGB 8.6 11/28/2020    HCT 26.1 11/28/2020    .0 11/28/2020    CREATSERUM 0.60 11/28/2020    BUN 8 11/28/2020     11/28/20

## 2020-11-28 NOTE — PROGRESS NOTES
[facility]  Gastroenterology Progress Note    Zeynep Roney Yadi Patient Status:  Inpatient    1955 MRN [id number]   Location [facility] 2W/SW Attending Padmini Truong MD   [facility] Day # 5 PCP Hari Fisher MD     Subjective: --   --   --   --   --   --   --   --   --  2.2   PHOS  --   --   --   --   --   --   --   --   --  3.3    < > = values in this interval not displayed.        Recent Labs   Lab 11/25/20  0616 11/26/20  0444 11/27/20  0351   DDIMER <0.27 0.27 0.35   CRP 0.54 hemorrhage, unspecified gastrointestinal hemorrhage type     COVID-19      Assessment/Plan:  1. Acute GI bleeding  2. 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   -etiology determined to be likely hypervas

## 2020-11-28 NOTE — PLAN OF CARE
Covid +: 11/23/2020  Symptom Onset:  Tmax: Afebrile  O2: room air @91%    Monitor Labs, currently not monitor labs, patient is asympomatic  LDH:  Ferritin:  CRP:  DDimer:    Antibiotics: IV zosyn  Blood Thinner: on hold - recent GI bleed  Decadron: on hold

## 2020-11-28 NOTE — PROGRESS NOTES
[facility]    General Surgery Progress Note  Natanael Vital  STT:[id number]  # 5       Subjective:   Feels better denies abdominal pain and denies N/V  Passing flatus and BM(s), no bleeding    Exam:     General: awake and alert, in ml   Net -460.92 ml       No results found.     Results:     Recent Labs   Lab 11/26/20  0444 11/26/20  0444 11/27/20  0351 11/27/20  1206 11/28/20  0531   RBC 2.02*  --  2.67*  --  2.90*   HGB 6.0*   < > 8.0* 8.4* 8.6*   HCT 18.3*  --  23.7*  --  26.1*   M

## 2020-11-30 NOTE — PAYOR COMM NOTE
--------------  DISCHARGE REVIEW    Payor: Willie KAPADIA/MARCELLUS  Subscriber #:  [id number]  Authorization Number: [id number]    Admit date: 11/23/20  Admit time:  4105  Discharge Date: 11/28/2020  4:00 PM     Admitting Physician: Kia Neal MD  Attendin PROVERA           Where to Get Your Medications      You can get these medications from any pharmacy    Bring a paper prescription for each of these medications  · acetaminophen 325 MG Tabs     HPI per chart  Patient is a 60year old female with PMH sig fo positive  - antibodies neg, iso for 10 days and 24 hours fever free   - seen by ID, will get repeat test as preop orders with Dr Emerson Yates      Possible transient hypoxia  -no clear desat recorded, pt was LH and hypotensive prior to transfusion morning of 1 the Dose Index Registry. Oral contrast was ingested. FINDINGS: LUNG BASES: The heart is normal in size. There are scattered bandlike opacities at both visualized lung bases.   There is more focal consolidation and volume loss in the posterior aspects of b Given the patient's history provided above, these findings could represent chronic sequela of prior COVID-19 pneumonia. 3. Cholelithiasis. 4. Nonobstructing subcentimeter stone and a 4.1 cm simple cortical cyst in the right kidney.     Dictated by (CST): Angi LIVER: Numerous hypodensities are seen throughout the liver, similar to prior, favored to reflect cysts. Largest is in the left hepatic lobe and measures up to 4.1 cm. No focal suspicious hepatic abnormality is noted.   SPLEEN: Heterogeneous appearance of colon.  A normal caliber appendix is identified. ABDOMINAL WALL: Tiny fat containing umbilical hernia. URINARY BLADDER: Normal.  No visible focal wall thickening, lesion, or calculus. PELVIC NODES: Normal.  No adenopathy.   PELVIC ORGANS: Normal.  No visib infiltrate/scarring. The rest of the lung fields are clear. No effusion or pleural thickening. BONES: There is demineralization of the bony structures. There is mild scoliosis. Mild osteoarthritic changes are seen in the spine and both shoulders.  OTHER: the right common femoral artery in retrograde fashion, and an image was recorded. Over a guidewire, a 6 Western Chiara vascular sheath was placed. Selective catheterization of the superior mesenteric artery was performed with a 5 Western Chiara Sos 2 catheter.   Superior continued hemorrhage. The patient will ultimately undergo exploratory surgery for small bowel tumor resection. Arsalan Pompa MD I have reviewed this study and agree with the above.  11/27/2020 11:15 AM        Operative Procedures: Procedure(s) (LRB):

## 2020-12-19 ENCOUNTER — NURSE ONLY (OUTPATIENT)
Dept: LAB | Facility: HOSPITAL | Age: 65
End: 2020-12-19
Attending: HOSPITALIST
Payer: COMMERCIAL

## 2020-12-19 DIAGNOSIS — K92.2 GASTROINTESTINAL HEMORRHAGE, UNSPECIFIED GASTROINTESTINAL HEMORRHAGE TYPE: ICD-10-CM

## 2020-12-19 DIAGNOSIS — Z01.818 PRE-OP TESTING: ICD-10-CM

## 2020-12-19 DIAGNOSIS — D61.818 PANCYTOPENIA (HCC): ICD-10-CM

## 2020-12-19 PROCEDURE — 36415 COLL VENOUS BLD VENIPUNCTURE: CPT

## 2020-12-19 PROCEDURE — 86900 BLOOD TYPING SEROLOGIC ABO: CPT

## 2020-12-19 PROCEDURE — 86850 RBC ANTIBODY SCREEN: CPT

## 2020-12-19 PROCEDURE — 85025 COMPLETE CBC W/AUTO DIFF WBC: CPT

## 2020-12-19 PROCEDURE — 86901 BLOOD TYPING SEROLOGIC RH(D): CPT

## 2020-12-21 ENCOUNTER — ANESTHESIA EVENT (OUTPATIENT)
Dept: SURGERY | Facility: HOSPITAL | Age: 65
DRG: 330 | End: 2020-12-21
Payer: COMMERCIAL

## 2020-12-21 ENCOUNTER — HOSPITAL ENCOUNTER (INPATIENT)
Facility: HOSPITAL | Age: 65
LOS: 2 days | Discharge: HOME | DRG: 330 | End: 2020-12-23
Attending: SURGERY | Admitting: SURGERY
Payer: COMMERCIAL

## 2020-12-21 ENCOUNTER — ANESTHESIA (OUTPATIENT)
Dept: SURGERY | Facility: HOSPITAL | Age: 65
DRG: 330 | End: 2020-12-21
Payer: COMMERCIAL

## 2020-12-21 DIAGNOSIS — K92.2 GASTROINTESTINAL HEMORRHAGE, UNSPECIFIED GASTROINTESTINAL HEMORRHAGE TYPE: ICD-10-CM

## 2020-12-21 DIAGNOSIS — Z01.818 PRE-OP TESTING: Primary | ICD-10-CM

## 2020-12-21 DIAGNOSIS — K63.89 SMALL BOWEL MASS: ICD-10-CM

## 2020-12-21 PROCEDURE — 0DT84ZZ RESECTION OF SMALL INTESTINE, PERCUTANEOUS ENDOSCOPIC APPROACH: ICD-10-PCS | Performed by: SURGERY

## 2020-12-21 PROCEDURE — 4A1BXSH MONITORING OF GASTROINTESTINAL VASCULAR PERFUSION USING INDOCYANINE GREEN DYE, EXTERNAL APPROACH: ICD-10-PCS | Performed by: ANESTHESIOLOGY

## 2020-12-21 PROCEDURE — 88342 IMHCHEM/IMCYTCHM 1ST ANTB: CPT | Performed by: SURGERY

## 2020-12-21 PROCEDURE — 3E0T3BZ INTRODUCTION OF ANESTHETIC AGENT INTO PERIPHERAL NERVES AND PLEXI, PERCUTANEOUS APPROACH: ICD-10-PCS | Performed by: ANESTHESIOLOGY

## 2020-12-21 PROCEDURE — 82962 GLUCOSE BLOOD TEST: CPT

## 2020-12-21 PROCEDURE — 88309 TISSUE EXAM BY PATHOLOGIST: CPT | Performed by: SURGERY

## 2020-12-21 PROCEDURE — 88307 TISSUE EXAM BY PATHOLOGIST: CPT | Performed by: SURGERY

## 2020-12-21 PROCEDURE — S0030 INJECTION, METRONIDAZOLE: HCPCS | Performed by: PHYSICIAN ASSISTANT

## 2020-12-21 PROCEDURE — C9290 INJ, BUPIVACAINE LIPOSOME: HCPCS | Performed by: SURGERY

## 2020-12-21 PROCEDURE — 88341 IMHCHEM/IMCYTCHM EA ADD ANTB: CPT | Performed by: SURGERY

## 2020-12-21 RX ORDER — ACETAMINOPHEN 500 MG
1000 TABLET ORAL ONCE
Status: COMPLETED | OUTPATIENT
Start: 2020-12-21 | End: 2020-12-21

## 2020-12-21 RX ORDER — HYDROCODONE BITARTRATE AND ACETAMINOPHEN 5; 325 MG/1; MG/1
2 TABLET ORAL AS NEEDED
Status: DISCONTINUED | OUTPATIENT
Start: 2020-12-21 | End: 2020-12-21 | Stop reason: HOSPADM

## 2020-12-21 RX ORDER — ACETAMINOPHEN 500 MG
1000 TABLET ORAL EVERY 8 HOURS
Status: DISCONTINUED | OUTPATIENT
Start: 2020-12-21 | End: 2020-12-23

## 2020-12-21 RX ORDER — ATORVASTATIN CALCIUM 40 MG/1
40 TABLET, FILM COATED ORAL DAILY
Status: DISCONTINUED | OUTPATIENT
Start: 2020-12-22 | End: 2020-12-23

## 2020-12-21 RX ORDER — CELECOXIB 200 MG/1
400 CAPSULE ORAL ONCE
Status: COMPLETED | OUTPATIENT
Start: 2020-12-21 | End: 2020-12-21

## 2020-12-21 RX ORDER — SODIUM CHLORIDE 0.9 % (FLUSH) 0.9 %
10 SYRINGE (ML) INJECTION AS NEEDED
Status: DISCONTINUED | OUTPATIENT
Start: 2020-12-21 | End: 2020-12-23

## 2020-12-21 RX ORDER — INDOCYANINE GREEN AND WATER 25 MG
KIT INJECTION AS NEEDED
Status: DISCONTINUED | OUTPATIENT
Start: 2020-12-21 | End: 2020-12-21 | Stop reason: SURG

## 2020-12-21 RX ORDER — FAMOTIDINE 10 MG/ML
20 INJECTION, SOLUTION INTRAVENOUS 2 TIMES DAILY
Status: DISCONTINUED | OUTPATIENT
Start: 2020-12-21 | End: 2020-12-23

## 2020-12-21 RX ORDER — METRONIDAZOLE 500 MG/100ML
500 INJECTION, SOLUTION INTRAVENOUS EVERY 8 HOURS
Status: COMPLETED | OUTPATIENT
Start: 2020-12-21 | End: 2020-12-22

## 2020-12-21 RX ORDER — DEXTROSE MONOHYDRATE 25 G/50ML
50 INJECTION, SOLUTION INTRAVENOUS
Status: DISCONTINUED | OUTPATIENT
Start: 2020-12-21 | End: 2020-12-21 | Stop reason: HOSPADM

## 2020-12-21 RX ORDER — HYDROCODONE BITARTRATE AND ACETAMINOPHEN 5; 325 MG/1; MG/1
1 TABLET ORAL EVERY 6 HOURS PRN
Qty: 15 TABLET | Refills: 0 | Status: SHIPPED | OUTPATIENT
Start: 2020-12-21 | End: 2021-01-12

## 2020-12-21 RX ORDER — LIDOCAINE HYDROCHLORIDE ANHYDROUS AND DEXTROSE MONOHYDRATE .8; 5 G/100ML; G/100ML
INJECTION, SOLUTION INTRAVENOUS CONTINUOUS PRN
Status: DISCONTINUED | OUTPATIENT
Start: 2020-12-21 | End: 2020-12-21 | Stop reason: SURG

## 2020-12-21 RX ORDER — POLYETHYLENE GLYCOL 3350 17 G/17G
17 POWDER, FOR SOLUTION ORAL DAILY PRN
Status: DISCONTINUED | OUTPATIENT
Start: 2020-12-21 | End: 2020-12-23

## 2020-12-21 RX ORDER — GABAPENTIN 300 MG/1
300 CAPSULE ORAL NIGHTLY
Status: DISCONTINUED | OUTPATIENT
Start: 2020-12-21 | End: 2020-12-23

## 2020-12-21 RX ORDER — GLYCOPYRROLATE 0.2 MG/ML
INJECTION, SOLUTION INTRAMUSCULAR; INTRAVENOUS AS NEEDED
Status: DISCONTINUED | OUTPATIENT
Start: 2020-12-21 | End: 2020-12-21 | Stop reason: SURG

## 2020-12-21 RX ORDER — HEPARIN SODIUM 5000 [USP'U]/ML
5000 INJECTION, SOLUTION INTRAVENOUS; SUBCUTANEOUS EVERY 8 HOURS SCHEDULED
Status: DISCONTINUED | OUTPATIENT
Start: 2020-12-22 | End: 2020-12-23

## 2020-12-21 RX ORDER — ALVIMOPAN 12 MG/1
12 CAPSULE ORAL 2 TIMES DAILY
Status: DISCONTINUED | OUTPATIENT
Start: 2020-12-22 | End: 2020-12-23

## 2020-12-21 RX ORDER — NEOSTIGMINE METHYLSULFATE 1 MG/ML
INJECTION INTRAVENOUS AS NEEDED
Status: DISCONTINUED | OUTPATIENT
Start: 2020-12-21 | End: 2020-12-21 | Stop reason: SURG

## 2020-12-21 RX ORDER — ROCURONIUM BROMIDE 10 MG/ML
INJECTION, SOLUTION INTRAVENOUS AS NEEDED
Status: DISCONTINUED | OUTPATIENT
Start: 2020-12-21 | End: 2020-12-21 | Stop reason: SURG

## 2020-12-21 RX ORDER — HYDROMORPHONE HYDROCHLORIDE 1 MG/ML
0.2 INJECTION, SOLUTION INTRAMUSCULAR; INTRAVENOUS; SUBCUTANEOUS
Status: DISCONTINUED | OUTPATIENT
Start: 2020-12-21 | End: 2020-12-23

## 2020-12-21 RX ORDER — MAGNESIUM OXIDE 400 MG (241.3 MG MAGNESIUM) TABLET
400 TABLET DAILY
Status: DISCONTINUED | OUTPATIENT
Start: 2020-12-21 | End: 2020-12-23

## 2020-12-21 RX ORDER — TRAMADOL HYDROCHLORIDE 50 MG/1
50 TABLET ORAL EVERY 6 HOURS PRN
Status: DISCONTINUED | OUTPATIENT
Start: 2020-12-21 | End: 2020-12-23

## 2020-12-21 RX ORDER — FAMOTIDINE 20 MG/1
20 TABLET ORAL 2 TIMES DAILY
Status: DISCONTINUED | OUTPATIENT
Start: 2020-12-21 | End: 2020-12-23

## 2020-12-21 RX ORDER — NALOXONE HYDROCHLORIDE 0.4 MG/ML
80 INJECTION, SOLUTION INTRAMUSCULAR; INTRAVENOUS; SUBCUTANEOUS AS NEEDED
Status: DISCONTINUED | OUTPATIENT
Start: 2020-12-21 | End: 2020-12-21 | Stop reason: HOSPADM

## 2020-12-21 RX ORDER — ALVIMOPAN 12 MG/1
12 CAPSULE ORAL 2 TIMES DAILY
Status: DISCONTINUED | OUTPATIENT
Start: 2020-12-21 | End: 2020-12-21

## 2020-12-21 RX ORDER — BUPIVACAINE HYDROCHLORIDE 2.5 MG/ML
INJECTION, SOLUTION EPIDURAL; INFILTRATION; INTRACAUDAL AS NEEDED
Status: DISCONTINUED | OUTPATIENT
Start: 2020-12-21 | End: 2020-12-21

## 2020-12-21 RX ORDER — HYDROMORPHONE HYDROCHLORIDE 1 MG/ML
0.4 INJECTION, SOLUTION INTRAMUSCULAR; INTRAVENOUS; SUBCUTANEOUS
Status: DISCONTINUED | OUTPATIENT
Start: 2020-12-21 | End: 2020-12-23

## 2020-12-21 RX ORDER — HEPARIN SODIUM 5000 [USP'U]/ML
5000 INJECTION, SOLUTION INTRAVENOUS; SUBCUTANEOUS ONCE
Status: COMPLETED | OUTPATIENT
Start: 2020-12-21 | End: 2020-12-21

## 2020-12-21 RX ORDER — HALOPERIDOL 5 MG/ML
0.25 INJECTION INTRAMUSCULAR ONCE AS NEEDED
Status: DISCONTINUED | OUTPATIENT
Start: 2020-12-21 | End: 2020-12-21 | Stop reason: HOSPADM

## 2020-12-21 RX ORDER — HYDROMORPHONE HYDROCHLORIDE 1 MG/ML
0.4 INJECTION, SOLUTION INTRAMUSCULAR; INTRAVENOUS; SUBCUTANEOUS EVERY 5 MIN PRN
Status: DISCONTINUED | OUTPATIENT
Start: 2020-12-21 | End: 2020-12-21 | Stop reason: HOSPADM

## 2020-12-21 RX ORDER — ONDANSETRON 2 MG/ML
4 INJECTION INTRAMUSCULAR; INTRAVENOUS ONCE AS NEEDED
Status: DISCONTINUED | OUTPATIENT
Start: 2020-12-21 | End: 2020-12-21 | Stop reason: HOSPADM

## 2020-12-21 RX ORDER — SODIUM CHLORIDE, SODIUM LACTATE, POTASSIUM CHLORIDE, CALCIUM CHLORIDE 600; 310; 30; 20 MG/100ML; MG/100ML; MG/100ML; MG/100ML
INJECTION, SOLUTION INTRAVENOUS CONTINUOUS
Status: DISCONTINUED | OUTPATIENT
Start: 2020-12-21 | End: 2020-12-21

## 2020-12-21 RX ORDER — FAMOTIDINE 20 MG/1
20 TABLET ORAL 2 TIMES DAILY
Status: DISCONTINUED | OUTPATIENT
Start: 2020-12-22 | End: 2020-12-21

## 2020-12-21 RX ORDER — INDOCYANINE GREEN AND WATER 25 MG
KIT INJECTION AS NEEDED
Status: DISCONTINUED | OUTPATIENT
Start: 2020-12-21 | End: 2020-12-21

## 2020-12-21 RX ORDER — SODIUM CHLORIDE, SODIUM LACTATE, POTASSIUM CHLORIDE, CALCIUM CHLORIDE 600; 310; 30; 20 MG/100ML; MG/100ML; MG/100ML; MG/100ML
INJECTION, SOLUTION INTRAVENOUS CONTINUOUS
Status: DISCONTINUED | OUTPATIENT
Start: 2020-12-21 | End: 2020-12-21 | Stop reason: HOSPADM

## 2020-12-21 RX ORDER — HYDROMORPHONE HYDROCHLORIDE 1 MG/ML
0.2 INJECTION, SOLUTION INTRAMUSCULAR; INTRAVENOUS; SUBCUTANEOUS EVERY 5 MIN PRN
Status: DISCONTINUED | OUTPATIENT
Start: 2020-12-21 | End: 2020-12-21 | Stop reason: HOSPADM

## 2020-12-21 RX ORDER — CEFAZOLIN SODIUM/WATER 2 G/20 ML
2 SYRINGE (ML) INTRAVENOUS EVERY 8 HOURS
Status: COMPLETED | OUTPATIENT
Start: 2020-12-21 | End: 2020-12-22

## 2020-12-21 RX ORDER — MORPHINE SULFATE 4 MG/ML
4 INJECTION, SOLUTION INTRAMUSCULAR; INTRAVENOUS EVERY 10 MIN PRN
Status: DISCONTINUED | OUTPATIENT
Start: 2020-12-21 | End: 2020-12-21 | Stop reason: HOSPADM

## 2020-12-21 RX ORDER — PROCHLORPERAZINE EDISYLATE 5 MG/ML
5 INJECTION INTRAMUSCULAR; INTRAVENOUS ONCE AS NEEDED
Status: DISCONTINUED | OUTPATIENT
Start: 2020-12-21 | End: 2020-12-21 | Stop reason: HOSPADM

## 2020-12-21 RX ORDER — LIDOCAINE HYDROCHLORIDE 10 MG/ML
INJECTION, SOLUTION EPIDURAL; INFILTRATION; INTRACAUDAL; PERINEURAL AS NEEDED
Status: DISCONTINUED | OUTPATIENT
Start: 2020-12-21 | End: 2020-12-21 | Stop reason: SURG

## 2020-12-21 RX ORDER — ONDANSETRON 2 MG/ML
4 INJECTION INTRAMUSCULAR; INTRAVENOUS EVERY 4 HOURS PRN
Status: DISCONTINUED | OUTPATIENT
Start: 2020-12-21 | End: 2020-12-23

## 2020-12-21 RX ORDER — HYDROCODONE BITARTRATE AND ACETAMINOPHEN 5; 325 MG/1; MG/1
1 TABLET ORAL AS NEEDED
Status: DISCONTINUED | OUTPATIENT
Start: 2020-12-21 | End: 2020-12-21 | Stop reason: HOSPADM

## 2020-12-21 RX ORDER — METRONIDAZOLE 500 MG/100ML
500 INJECTION, SOLUTION INTRAVENOUS ONCE
Status: COMPLETED | OUTPATIENT
Start: 2020-12-21 | End: 2020-12-21

## 2020-12-21 RX ORDER — DEXTROSE AND SODIUM CHLORIDE 5; .45 G/100ML; G/100ML
2 INJECTION, SOLUTION INTRAVENOUS CONTINUOUS
Status: DISCONTINUED | OUTPATIENT
Start: 2020-12-21 | End: 2020-12-22

## 2020-12-21 RX ORDER — HYDROMORPHONE HYDROCHLORIDE 1 MG/ML
0.6 INJECTION, SOLUTION INTRAMUSCULAR; INTRAVENOUS; SUBCUTANEOUS EVERY 5 MIN PRN
Status: DISCONTINUED | OUTPATIENT
Start: 2020-12-21 | End: 2020-12-21 | Stop reason: HOSPADM

## 2020-12-21 RX ORDER — MORPHINE SULFATE 4 MG/ML
2 INJECTION, SOLUTION INTRAMUSCULAR; INTRAVENOUS EVERY 10 MIN PRN
Status: DISCONTINUED | OUTPATIENT
Start: 2020-12-21 | End: 2020-12-21 | Stop reason: HOSPADM

## 2020-12-21 RX ORDER — DOCUSATE SODIUM 100 MG/1
100 CAPSULE, LIQUID FILLED ORAL 2 TIMES DAILY
Status: DISCONTINUED | OUTPATIENT
Start: 2020-12-21 | End: 2020-12-23

## 2020-12-21 RX ORDER — CEFAZOLIN SODIUM/WATER 2 G/20 ML
2 SYRINGE (ML) INTRAVENOUS ONCE
Status: COMPLETED | OUTPATIENT
Start: 2020-12-21 | End: 2020-12-21

## 2020-12-21 RX ORDER — MORPHINE SULFATE 10 MG/ML
6 INJECTION, SOLUTION INTRAMUSCULAR; INTRAVENOUS EVERY 10 MIN PRN
Status: DISCONTINUED | OUTPATIENT
Start: 2020-12-21 | End: 2020-12-21 | Stop reason: HOSPADM

## 2020-12-21 RX ORDER — DEXAMETHASONE SODIUM PHOSPHATE 4 MG/ML
VIAL (ML) INJECTION AS NEEDED
Status: DISCONTINUED | OUTPATIENT
Start: 2020-12-21 | End: 2020-12-21 | Stop reason: SURG

## 2020-12-21 RX ORDER — EPHEDRINE SULFATE 50 MG/ML
INJECTION, SOLUTION INTRAVENOUS AS NEEDED
Status: DISCONTINUED | OUTPATIENT
Start: 2020-12-21 | End: 2020-12-21 | Stop reason: SURG

## 2020-12-21 RX ORDER — HEPARIN SODIUM 1000 [USP'U]/ML
INJECTION, SOLUTION INTRAVENOUS; SUBCUTANEOUS AS NEEDED
Status: DISCONTINUED | OUTPATIENT
Start: 2020-12-21 | End: 2020-12-21

## 2020-12-21 RX ADMIN — METRONIDAZOLE 500 MG: 500 INJECTION, SOLUTION INTRAVENOUS at 14:35:00

## 2020-12-21 RX ADMIN — ROCURONIUM BROMIDE 50 MG: 10 INJECTION, SOLUTION INTRAVENOUS at 14:32:00

## 2020-12-21 RX ADMIN — GLYCOPYRROLATE 0.6 MG: 0.2 INJECTION, SOLUTION INTRAMUSCULAR; INTRAVENOUS at 16:30:00

## 2020-12-21 RX ADMIN — SODIUM CHLORIDE, SODIUM LACTATE, POTASSIUM CHLORIDE, CALCIUM CHLORIDE: 600; 310; 30; 20 INJECTION, SOLUTION INTRAVENOUS at 16:57:00

## 2020-12-21 RX ADMIN — LIDOCAINE HYDROCHLORIDE ANHYDROUS AND DEXTROSE MONOHYDRATE 1 MG/MIN: .8; 5 INJECTION, SOLUTION INTRAVENOUS at 14:50:00

## 2020-12-21 RX ADMIN — NEOSTIGMINE METHYLSULFATE 3 MG: 1 INJECTION INTRAVENOUS at 16:29:00

## 2020-12-21 RX ADMIN — EPHEDRINE SULFATE 5 MG: 50 INJECTION, SOLUTION INTRAVENOUS at 15:38:00

## 2020-12-21 RX ADMIN — CEFAZOLIN SODIUM/WATER 2 G: 2 G/20 ML SYRINGE (ML) INTRAVENOUS at 14:40:00

## 2020-12-21 RX ADMIN — INDOCYANINE GREEN AND WATER 7 MG: 25 MG KIT INJECTION at 15:30:00

## 2020-12-21 RX ADMIN — GLYCOPYRROLATE 0.2 MG: 0.2 INJECTION, SOLUTION INTRAMUSCULAR; INTRAVENOUS at 14:28:00

## 2020-12-21 RX ADMIN — DEXAMETHASONE SODIUM PHOSPHATE 4 MG: 4 MG/ML VIAL (ML) INJECTION at 14:28:00

## 2020-12-21 RX ADMIN — LIDOCAINE HYDROCHLORIDE 50 MG: 10 INJECTION, SOLUTION EPIDURAL; INFILTRATION; INTRACAUDAL; PERINEURAL at 14:32:00

## 2020-12-21 NOTE — H&P
Sandeep Rodríguez is a 63year old female. 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 She underwent angiography with embolization on 11/26/2020.     Current co inspiration. Denies Shortness of breath at rest.   Gastrointestinal:   Denies Nausea. Musculoskeletal:   Denies Sciatica. Denies Weakness. Peripheral Vascular:   Denies Pain/cramping in legs after exertion. Denies Painful extremities.    Skin:   Denies laparoscopy, SB resection) will be required since hypervascular tumor was found on angiography and CT enterography.   The patient understood the indications, details and potential risks and complications including  bleeding, infection, leak, sepsis, need fo

## 2020-12-21 NOTE — ANESTHESIA PROCEDURE NOTES
Airway  Urgency: Elective      General Information and Staff    Patient location during procedure: OR  Anesthesiologist: Ruddy Paredes MD  Resident/CRNA: Lelo Lovelace CRNA  Performed: CRNA     Indications and Patient Condition  Indications for air

## 2020-12-21 NOTE — ANESTHESIA PREPROCEDURE EVALUATION
Anesthesia PreOp Note    HPI:     Harding Meigs is a 63year old female who presents for preoperative consultation requested by: Lisette Dakins, MD    Date of Surgery: 12/21/2020    Procedure(s):  LAPAROSCOPIC COLON RESECTION - RIGHT  Indication evening prior to surgery, Disp: 6 tablet, Rfl: 0, 12/20/2020 at 2100    •  Metoclopramide HCl (REGLAN) 10 MG Oral Tab, TAKE 1 TABLET BY MOUTH 1/2 HR PRIOR TO STARTING LAXATIVE., Disp: 2 tablet, Rfl: 0, 12/20/2020 at 1130    •  famoTIDine (PEPCID) 20 MG Ora Activity      Alcohol use:  Yes        Alcohol/week: 0.0 standard drinks        Comment: Very rare wine      Drug use: No      Sexual activity: Yes        Partners: Male    Lifestyle      Physical activity        Days per week: Not on file        Minutes pe Evaluation     Patient summary reviewed and Nursing notes reviewed    Airway   Mallampati: II  TM distance: >3 FB  Neck ROM: full  Dental - normal exam     Pulmonary - negative ROS and normal exam   Cardiovascular - negative ROS and normal exam    Neuro/Ps

## 2020-12-21 NOTE — BRIEF OP NOTE
Pre-Operative Diagnosis: Small bowel mass [K63.89]  Gastrointestinal hemorrhage, unspecified gastrointestinal hemorrhage type [K92.2]     Post-Operative Diagnosis: Small bowel mass [K63.89]Gastrointestinal hemorrhage, unspecified gastrointestinal hemorrhag

## 2020-12-22 PROCEDURE — 83735 ASSAY OF MAGNESIUM: CPT | Performed by: PHYSICIAN ASSISTANT

## 2020-12-22 PROCEDURE — 97161 PT EVAL LOW COMPLEX 20 MIN: CPT

## 2020-12-22 PROCEDURE — 80048 BASIC METABOLIC PNL TOTAL CA: CPT | Performed by: PHYSICIAN ASSISTANT

## 2020-12-22 PROCEDURE — 84100 ASSAY OF PHOSPHORUS: CPT | Performed by: PHYSICIAN ASSISTANT

## 2020-12-22 PROCEDURE — S0030 INJECTION, METRONIDAZOLE: HCPCS | Performed by: PHYSICIAN ASSISTANT

## 2020-12-22 PROCEDURE — 97165 OT EVAL LOW COMPLEX 30 MIN: CPT

## 2020-12-22 PROCEDURE — 85025 COMPLETE CBC W/AUTO DIFF WBC: CPT | Performed by: PHYSICIAN ASSISTANT

## 2020-12-22 RX ORDER — SODIUM CHLORIDE, SODIUM LACTATE, POTASSIUM CHLORIDE, CALCIUM CHLORIDE 600; 310; 30; 20 MG/100ML; MG/100ML; MG/100ML; MG/100ML
INJECTION, SOLUTION INTRAVENOUS CONTINUOUS
Status: DISCONTINUED | OUTPATIENT
Start: 2020-12-22 | End: 2020-12-23

## 2020-12-22 NOTE — PAYOR COMM NOTE
--------------  ADMISSION REVIEW     Payor: [address] [place] [address] KANG/MARCELLUS  Subscriber #:  [id number]  Authorization Number: [id number]    Admit date: 12/21/20  Admit time: 1257       Admitting Physician: Jackie Garcia MD  Attending Physician:  Pam Ram DO bilaterally  Cardiac: RRR and nl S1,S2, no S3, no S4  Abdomen: normal BS, nondistended and nontender   Extremities: calves nontender, no edema, SCD's on, motor intact and sensory intact  Wounds: clean, dry and intact     Doing well  Diet: Clear liquid diet Oral Eryn Villafuerte RN    12/21/2020 2229 Given 1,000 mg Oral Alba OGDEN RN      Alvimopan (ENTEREG) capsule 12 mg     Date Action Dose Route User    12/21/2020 1415 Given 12 mg Oral Vj Gonzalez RN      Alvimopan (ENTEREG) capsule 12 mg Oral Alba OGDEN RN      fentaNYL citrate (SUBLIMAZE) 0.05 MG/ML injection 25 mcg     Date Action Dose Route User    12/21/2020 1724 Given 25 mcg Intravenous Ivet Reddy RN    12/21/2020 1709 Given 25 mcg Intravenous DENISE Clark Mary Ellen Christian CRNA      lidocaine in D5W infusion 2 g/250 mL premix     Date Action Dose Route User    12/21/2020 1450 New Bag 1 mg/min Intravenous Mary Ellen Christian CRNA      magnesium oxide (MAG-OX) tab 400 mg     Date Action Dose Route User    12/22/2020

## 2020-12-22 NOTE — CONSULTS
[facility] Hospitalist H&P       CC: Post operative medical management      Procedure: Laparoscopic small bowel resection, 12/21/20     PCP: Katarina Aldana MD    History of Present Illness:   Mrs. Lurdes Corrales is  63year old female with recent diagnosis of jejunal Smokeless tobacco: Never Used      Tobacco comment: Promises to [address]!!! Alcohol use:  Yes      Alcohol/week: 0.0 standard drinks      Comment: Very rare wine       Fam Hx  Family History   Problem Relation Age of Onset   • Heart Disease Father         de

## 2020-12-22 NOTE — PLAN OF CARE
Pt is A&Ox4, VSS on room air. Continues to deny pain. Voiding freely. Tolerating full liquid diet, no n/v. IV fluids infusing. PT signed off today. Up independently after set up, ambulating in hallway.  Call light within reach, daughter at bedside for part Utilize distraction and/or relaxation techniques  - Monitor for opioid side effects  - Notify MD/LIP if interventions unsuccessful or patient reports new pain  - Anticipate increased pain with activity and pre-medicate as appropriate  Outcome: Progressing ordered and tolerated  - Nasogastric tube to low intermittent suction as ordered  - Evaluate effectiveness of ordered antiemetic medications  - Provide nonpharmacologic comfort measures as appropriate  - Advance diet as tolerated, if ordered  - Obtain nutr skin integrity  - Assess and document dressing/incision, wound bed, drain sites and surrounding tissue  - Implement wound care per orders  - Initiate isolation precautions as appropriate  - Initiate Pressure Ulcer prevention bundle as indicated  Outcome: P

## 2020-12-22 NOTE — PROGRESS NOTES
[facility] Hospitalist Progress Note     CC: Hospital Follow up    PCP: Gabby Agrawal MD     Assessment/Plan:   Mrs. 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

## 2020-12-22 NOTE — OCCUPATIONAL THERAPY NOTE
OCCUPATIONAL THERAPY EVALUATION - INPATIENT     Room Number: 461/461-A  Evaluation Date: 12/22/2020  Type of Evaluation: Initial  Presenting Problem: laparoscopic colon resection    Physician Order: IP Consult to Occupational Therapy  Reason for Therapy: admission.     OCCUPATIONAL THERAPY MEDICAL/SOCIAL HISTORY     Problem List  Active Problems:    Small bowel mass      Past Medical History  Past Medical History:   Diagnosis Date   • Abnormal fasting glucose    • High cholesterol    • History of blood mercedes cooperative     RANGE OF MOTION   Upper extremity ROM is within functional limits    STRENGTH ASSESSMENT  Upper extremity strength is within functional limits     COORDINATION  Gross Motor: WFL   Fine Motor: WFL     ACTIVITIES OF DAILY LIVING ASSESSMENT  A OTR/KANG Vizcaino

## 2020-12-22 NOTE — PHYSICAL THERAPY NOTE
PHYSICAL THERAPY EVALUATION - INPATIENT    Room Number: 461/461-A  Evaluation Date: 12/22/2020  Presenting Problem: s/p laparoscopic small bowel resection of jejunal mass on 12/21  Physician Order: PT Eval and Treat    Problem List  Active Problems:    S None   -   Need to walk in hospital room?: None   -   Climbing 3-5 steps with a railing?: None       AM-PAC Score:  Raw Score: 24   Approx Degree of Impairment: 0%   Standardized Score (AM-PAC Scale): 61.14   CMS Modifier (G-Code): CH      FUNCTIONAL ABILI

## 2020-12-22 NOTE — OPERATIVE REPORT
[facility]    PATIENT'S NAME: Miki Washingtonlang PANIAGUA   ATTENDING PHYSICIAN: Rohini Mendoza DO   OPERATING PHYSICIAN: Rogelio Jarvis.  Zuly Ahn MD   PATIENT ACCOUNT#:   [id number]    LOCATION:  [address] [place] [address] #:   [id number]       DATE OF BI Then, local anesthetic was placed, and a 5 mm port was placed on the right side of the abdomen. This was done using the Visiport technique. The abdomen was insufflated.   Then, right upper quadrant and right lower quadrant 5 mm ports were placed under dir was insufflated again and under laparoscopic guidance, we did further TAP blocks on both sides and then all ports were removed after the abdomen was desufflated, and all incisions were closed using 4-0 Vicryl subcuticular sutures.   Dermabond was used for t

## 2020-12-22 NOTE — PROGRESS NOTES
[facility] \[facility]\"" - [facility]    General Surgery Progress Note  Brad Richardson  : 1955  CSN: [id number]  HD# 1    Subjective:   POD#1 small bowel resection   Feels well mild incisional pain as expected and denies N/V  Passing no flatus or BM WBC 4.2 12/22/2020    HGB 8.9 12/22/2020    HCT 29.3 12/22/2020    .0 12/22/2020     12/22/2020    K 4.5 12/22/2020     12/22/2020    CO2 26.0 12/22/2020    BUN 12 12/22/2020    CREATSERUM 0.86 12/22/2020     12/22/2020    MG 2.2

## 2020-12-22 NOTE — PLAN OF CARE
Problem: Patient Centered Care  Goal: Patient preferences are identified and integrated in the patient's plan of care  Description: Interventions:  - What would you like us to know as we care for you?   - Provide timely, complete, and accurate informatio behaviors that affect risk of falls.   - [place] fall precautions as indicated by assessment.  - Educate pt/family on patient safety including physical limitations  - Instruct pt to call for assistance with activity based on assessment  - Modify environme

## 2020-12-23 VITALS
WEIGHT: 148.5 LBS | HEIGHT: 65 IN | SYSTOLIC BLOOD PRESSURE: 106 MMHG | TEMPERATURE: 99 F | DIASTOLIC BLOOD PRESSURE: 51 MMHG | HEART RATE: 91 BPM | RESPIRATION RATE: 16 BRPM | OXYGEN SATURATION: 91 % | BODY MASS INDEX: 24.74 KG/M2

## 2020-12-23 PROCEDURE — 84100 ASSAY OF PHOSPHORUS: CPT | Performed by: PHYSICIAN ASSISTANT

## 2020-12-23 PROCEDURE — 80048 BASIC METABOLIC PNL TOTAL CA: CPT | Performed by: PHYSICIAN ASSISTANT

## 2020-12-23 PROCEDURE — 83735 ASSAY OF MAGNESIUM: CPT | Performed by: PHYSICIAN ASSISTANT

## 2020-12-23 PROCEDURE — 85025 COMPLETE CBC W/AUTO DIFF WBC: CPT | Performed by: PHYSICIAN ASSISTANT

## 2020-12-23 NOTE — PLAN OF CARE
Problem: Patient Centered Care  Goal: Patient preferences are identified and integrated in the patient's plan of care  Description: Interventions:  - What would you like us to know as we care for you?  From home with spouse  - Provide timely, complete, an ADULT - FALL  Goal: Free from fall injury  Description: INTERVENTIONS:  - Assess pt frequently for physical needs  - Identify cognitive and physical deficits and behaviors that affect risk of falls. - [place] fall precautions as indicated by assessment. needed  - Evaluate fluid balance  Outcome: Progressing  Goal: Maintains or returns to baseline bowel function  Description: INTERVENTIONS:  - Assess bowel function  - Maintain adequate hydration with IV or PO as ordered and tolerated  - Evaluate effectiven acute changes noted. Continues on IV fluids and scheduled Tylenol. No complaints of pain at this time. Lap sites with intact dressing. Tolerating full liquids. Voiding freely. Safety measures in place. Continue to monitor.

## 2020-12-23 NOTE — PROGRESS NOTES
[facility]    General Surgery Progress Note  Leopold Plunk  : 1955  CSN: [id number]  HD# 2    Subjective:   POD#2 small bowel resection   Feels well denies abdominal pain and denies N/V    Exam:     General: awake and alert, 172.0 166.0       Recent Labs   Lab 12/22/20  0623 12/23/20  0611   * 93   BUN 12 17   CREATSERUM 0.86 0.87   GFRAA 83 82   GFRNAA 72 71   CA 8.7 8.5    144   K 4.5 3.9    113*   CO2 26.0 27.0       Lab Results   Component Value Date

## 2020-12-23 NOTE — DISCHARGE SUMMARY
General Medicine Discharge Summary     Patient ID:  Melissa Gandhi  63year old  11/24/1955    Admit date: 12/21/2020    Discharge date and time: 12/23/2020    Attending Physician: Teena Cee MD     Consults: IP CONSULT TO RESPIRATORY CARE  IP CONSU surgery        Operative Procedures: Procedure(s) (LRB):  LAPAROSCOPIC COLON RESECTION - RIGHT (Right)     Imaging: No results found.       Disposition: home    Activity: activity as tolerated  Diet: regular diet  Wound Care: as directed  Code Status: Full have stopped taking prescription pain medications. Drive locally at first and keep safe distances to other vehicles. Your reaction times may be slower by a half second for up to a month after surgery.     Medications:    Prescription pain medication can ma therapeutic plan as outlined    Thank Shayy Santana M.D.  [facility] Hospitalist  Pager

## 2020-12-23 NOTE — PLAN OF CARE
Patient alert and oriented. Per MD ok for discharge. Diet graduated to low fiber soft- one bowel movement that was soft and black, MD is aware. Incision dressing removed, incision site clean, dry, intact. Prescriptions given.  Discharge instructions reviewe anxiety  - Utilize distraction and/or relaxation techniques  - Monitor for opioid side effects  - Notify MD/LIP if interventions unsuccessful or patient reports new pain  - Anticipate increased pain with activity and pre-medicate as appropriate  Outcome: C ordered and tolerated  - Nasogastric tube to low intermittent suction as ordered  - Evaluate effectiveness of ordered antiemetic medications  - Provide nonpharmacologic comfort measures as appropriate  - Advance diet as tolerated, if ordered  - Obtain nutr integrity  - Assess and document dressing/incision, wound bed, drain sites and surrounding tissue  - Implement wound care per orders  - Initiate isolation precautions as appropriate  - Initiate Pressure Ulcer prevention bundle as indicated  Outcome: Comple

## 2020-12-24 NOTE — PAYOR COMM NOTE
--------------  DISCHARGE REVIEW    Payor: [address] [place] [address] KANG/MARCELLUS  Subscriber #:  [id number]  Authorization Number: [id number]    Admit date: 12/21/20  Admit time:  1257  Discharge Date: 12/23/2020  3:47 PM     Admitting Physician: MD Birgit Hoang is quite awake and alert. She feels ok she states. She denies any pain. Breathing is stable. Vitals are ok. Hospital Course:   Mrs. Ofelia Molina is  60 year old female with recent diagnosis of jejunal mass that was found 11/2020, that cause her to have acute Alpa Weeks MD In 1 week. Specialty: Internal Medicine  Why: As needed  Contact information:  [facility] [address] [facility] instructions:       Other Discharge Instructions:       Discharge [place] incisions become somewhat hard and sometimes lumpy. That is part of the normal healing process. Bruising around the incisions or lower is also normal and should resolve with time. Low grade fever up to 101F is normal after surgery.  Severe swelling, fev

## 2021-01-12 PROBLEM — C49.A3 GIST (GASTROINTESTINAL STROMAL TUMOR) OF SMALL BOWEL, MALIGNANT (HCC): Status: ACTIVE | Noted: 2021-01-12

## 2021-01-12 PROBLEM — K92.2 GASTROINTESTINAL HEMORRHAGE, UNSPECIFIED GASTROINTESTINAL HEMORRHAGE TYPE: Status: RESOLVED | Noted: 2020-11-23 | Resolved: 2021-01-12

## 2021-05-14 PROCEDURE — 88305 TISSUE EXAM BY PATHOLOGIST: CPT | Performed by: OBSTETRICS & GYNECOLOGY

## 2021-06-28 PROBLEM — C49.A3 GIST (GASTROINTESTINAL STROMAL TUMOR) OF SMALL BOWEL, MALIGNANT (HCC): Status: RESOLVED | Noted: 2021-01-12 | Resolved: 2021-06-28

## 2021-06-28 PROBLEM — Z85.09 HISTORY OF GASTROINTESTINAL STROMAL TUMOR (GIST): Status: ACTIVE | Noted: 2021-06-28

## 2021-06-28 PROBLEM — R73.03 PREDIABETES: Status: ACTIVE | Noted: 2021-06-28

## 2021-06-28 PROBLEM — K92.1 MELENA: Status: RESOLVED | Noted: 2020-11-23 | Resolved: 2021-06-28

## 2021-06-28 PROBLEM — K63.89 SMALL BOWEL MASS: Status: RESOLVED | Noted: 2020-12-21 | Resolved: 2021-06-28

## 2021-06-28 PROBLEM — U07.1 COVID-19: Status: RESOLVED | Noted: 2020-11-23 | Resolved: 2021-06-28

## 2021-07-05 ENCOUNTER — HOSPITAL ENCOUNTER (OUTPATIENT)
Facility: HOSPITAL | Age: 66
Setting detail: OBSERVATION
LOS: 1 days | Discharge: HOME | End: 2021-07-07
Attending: EMERGENCY MEDICINE | Admitting: HOSPITALIST
Payer: COMMERCIAL

## 2021-07-05 ENCOUNTER — APPOINTMENT (OUTPATIENT)
Dept: CT IMAGING | Facility: HOSPITAL | Age: 66
End: 2021-07-05
Attending: EMERGENCY MEDICINE
Payer: COMMERCIAL

## 2021-07-05 DIAGNOSIS — K81.0 ACUTE CHOLECYSTITIS: Primary | ICD-10-CM

## 2021-07-05 LAB
ALBUMIN SERPL-MCNC: 3.9 G/DL (ref 3.4–5)
ALP LIVER SERPL-CCNC: 100 U/L
ALT SERPL-CCNC: 26 U/L
ANION GAP SERPL CALC-SCNC: 3 MMOL/L (ref 0–18)
AST SERPL-CCNC: 18 U/L (ref 15–37)
BASOPHILS # BLD AUTO: 0 X10(3) UL (ref 0–0.2)
BASOPHILS NFR BLD AUTO: 0 %
BILIRUB DIRECT SERPL-MCNC: <0.1 MG/DL (ref 0–0.2)
BILIRUB SERPL-MCNC: 0.9 MG/DL (ref 0.1–2)
BILIRUB UR QL: NEGATIVE
BUN BLD-MCNC: 13 MG/DL (ref 7–18)
BUN/CREAT SERPL: 18.3 (ref 10–20)
CALCIUM BLD-MCNC: 9 MG/DL (ref 8.5–10.1)
CHLORIDE SERPL-SCNC: 107 MMOL/L (ref 98–112)
CLARITY UR: CLEAR
CO2 SERPL-SCNC: 26 MMOL/L (ref 21–32)
COLOR UR: YELLOW
CREAT BLD-MCNC: 0.71 MG/DL
DEPRECATED RDW RBC AUTO: 54.4 FL (ref 35.1–46.3)
EOSINOPHIL # BLD AUTO: 0.02 X10(3) UL (ref 0–0.7)
EOSINOPHIL NFR BLD AUTO: 0.4 %
ERYTHROCYTE [DISTWIDTH] IN BLOOD BY AUTOMATED COUNT: 17.3 % (ref 11–15)
GLUCOSE BLD-MCNC: 103 MG/DL (ref 70–99)
GLUCOSE UR-MCNC: NEGATIVE MG/DL
HCT VFR BLD AUTO: 35 %
HGB BLD-MCNC: 10.7 G/DL
IMM GRANULOCYTES # BLD AUTO: 0.01 X10(3) UL (ref 0–1)
IMM GRANULOCYTES NFR BLD: 0.2 %
KETONES UR-MCNC: NEGATIVE MG/DL
LEUKOCYTE ESTERASE UR QL STRIP.AUTO: NEGATIVE
LIPASE SERPL-CCNC: 153 U/L (ref 73–393)
LYMPHOCYTES # BLD AUTO: 2.06 X10(3) UL (ref 1–4)
LYMPHOCYTES NFR BLD AUTO: 43.6 %
M PROTEIN MFR SERPL ELPH: 8.5 G/DL (ref 6.4–8.2)
MCH RBC QN AUTO: 26.5 PG (ref 26–34)
MCHC RBC AUTO-ENTMCNC: 30.6 G/DL (ref 31–37)
MCV RBC AUTO: 86.6 FL
MONOCYTES # BLD AUTO: 0.63 X10(3) UL (ref 0.1–1)
MONOCYTES NFR BLD AUTO: 13.3 %
NEUTROPHILS # BLD AUTO: 2.01 X10 (3) UL (ref 1.5–7.7)
NEUTROPHILS # BLD AUTO: 2.01 X10(3) UL (ref 1.5–7.7)
NEUTROPHILS NFR BLD AUTO: 42.5 %
NITRITE UR QL STRIP.AUTO: NEGATIVE
OSMOLALITY SERPL CALC.SUM OF ELEC: 282 MOSM/KG (ref 275–295)
PH UR: 6 [PH] (ref 5–8)
PLATELET # BLD AUTO: 108 10(3)UL (ref 150–450)
POTASSIUM SERPL-SCNC: 3.5 MMOL/L (ref 3.5–5.1)
PROT UR-MCNC: NEGATIVE MG/DL
RBC # BLD AUTO: 4.04 X10(6)UL
SODIUM SERPL-SCNC: 136 MMOL/L (ref 136–145)
SP GR UR STRIP: 1.01 (ref 1–1.03)
UROBILINOGEN UR STRIP-ACNC: <2
WBC # BLD AUTO: 4.7 X10(3) UL (ref 4–11)

## 2021-07-05 PROCEDURE — 80076 HEPATIC FUNCTION PANEL: CPT | Performed by: EMERGENCY MEDICINE

## 2021-07-05 PROCEDURE — 96361 HYDRATE IV INFUSION ADD-ON: CPT

## 2021-07-05 PROCEDURE — 85025 COMPLETE CBC W/AUTO DIFF WBC: CPT | Performed by: EMERGENCY MEDICINE

## 2021-07-05 PROCEDURE — 99285 EMERGENCY DEPT VISIT HI MDM: CPT

## 2021-07-05 PROCEDURE — 80048 BASIC METABOLIC PNL TOTAL CA: CPT | Performed by: EMERGENCY MEDICINE

## 2021-07-05 PROCEDURE — 96375 TX/PRO/DX INJ NEW DRUG ADDON: CPT

## 2021-07-05 PROCEDURE — 83690 ASSAY OF LIPASE: CPT | Performed by: EMERGENCY MEDICINE

## 2021-07-05 PROCEDURE — 81001 URINALYSIS AUTO W/SCOPE: CPT | Performed by: EMERGENCY MEDICINE

## 2021-07-05 PROCEDURE — 96365 THER/PROPH/DIAG IV INF INIT: CPT

## 2021-07-05 PROCEDURE — 74176 CT ABD & PELVIS W/O CONTRAST: CPT | Performed by: EMERGENCY MEDICINE

## 2021-07-05 RX ORDER — POLYETHYLENE GLYCOL 3350 17 G/17G
17 POWDER, FOR SOLUTION ORAL DAILY PRN
Status: DISCONTINUED | OUTPATIENT
Start: 2021-07-05 | End: 2021-07-07

## 2021-07-05 RX ORDER — PROCHLORPERAZINE EDISYLATE 5 MG/ML
5 INJECTION INTRAMUSCULAR; INTRAVENOUS EVERY 8 HOURS PRN
Status: DISCONTINUED | OUTPATIENT
Start: 2021-07-05 | End: 2021-07-06

## 2021-07-05 RX ORDER — SODIUM PHOSPHATE, DIBASIC AND SODIUM PHOSPHATE, MONOBASIC 7; 19 G/133ML; G/133ML
1 ENEMA RECTAL ONCE AS NEEDED
Status: DISCONTINUED | OUTPATIENT
Start: 2021-07-05 | End: 2021-07-06

## 2021-07-05 RX ORDER — ONDANSETRON 2 MG/ML
4 INJECTION INTRAMUSCULAR; INTRAVENOUS EVERY 6 HOURS PRN
Status: DISCONTINUED | OUTPATIENT
Start: 2021-07-05 | End: 2021-07-07

## 2021-07-05 RX ORDER — SODIUM CHLORIDE 9 MG/ML
INJECTION, SOLUTION INTRAVENOUS CONTINUOUS
Status: DISCONTINUED | OUTPATIENT
Start: 2021-07-05 | End: 2021-07-06

## 2021-07-05 RX ORDER — HYDROMORPHONE HYDROCHLORIDE 1 MG/ML
0.2 INJECTION, SOLUTION INTRAMUSCULAR; INTRAVENOUS; SUBCUTANEOUS EVERY 2 HOUR PRN
Status: DISCONTINUED | OUTPATIENT
Start: 2021-07-05 | End: 2021-07-07

## 2021-07-05 RX ORDER — HYDROCODONE BITARTRATE AND ACETAMINOPHEN 5; 325 MG/1; MG/1
2 TABLET ORAL EVERY 4 HOURS PRN
Status: DISCONTINUED | OUTPATIENT
Start: 2021-07-05 | End: 2021-07-07

## 2021-07-05 RX ORDER — MORPHINE SULFATE 4 MG/ML
4 INJECTION, SOLUTION INTRAMUSCULAR; INTRAVENOUS ONCE
Status: COMPLETED | OUTPATIENT
Start: 2021-07-05 | End: 2021-07-05

## 2021-07-05 RX ORDER — ONDANSETRON 2 MG/ML
4 INJECTION INTRAMUSCULAR; INTRAVENOUS ONCE
Status: COMPLETED | OUTPATIENT
Start: 2021-07-05 | End: 2021-07-05

## 2021-07-05 RX ORDER — HYDROCODONE BITARTRATE AND ACETAMINOPHEN 5; 325 MG/1; MG/1
1 TABLET ORAL EVERY 4 HOURS PRN
Status: DISCONTINUED | OUTPATIENT
Start: 2021-07-05 | End: 2021-07-07

## 2021-07-05 RX ORDER — KETOROLAC TROMETHAMINE 15 MG/ML
15 INJECTION, SOLUTION INTRAMUSCULAR; INTRAVENOUS ONCE
Status: COMPLETED | OUTPATIENT
Start: 2021-07-05 | End: 2021-07-05

## 2021-07-05 RX ORDER — HYDROMORPHONE HYDROCHLORIDE 1 MG/ML
0.4 INJECTION, SOLUTION INTRAMUSCULAR; INTRAVENOUS; SUBCUTANEOUS EVERY 2 HOUR PRN
Status: DISCONTINUED | OUTPATIENT
Start: 2021-07-05 | End: 2021-07-07

## 2021-07-05 RX ORDER — ACETAMINOPHEN 325 MG/1
650 TABLET ORAL EVERY 4 HOURS PRN
Status: DISCONTINUED | OUTPATIENT
Start: 2021-07-05 | End: 2021-07-07

## 2021-07-05 RX ORDER — HYDROMORPHONE HYDROCHLORIDE 1 MG/ML
0.8 INJECTION, SOLUTION INTRAMUSCULAR; INTRAVENOUS; SUBCUTANEOUS EVERY 2 HOUR PRN
Status: DISCONTINUED | OUTPATIENT
Start: 2021-07-05 | End: 2021-07-06

## 2021-07-05 RX ORDER — BISACODYL 10 MG
10 SUPPOSITORY, RECTAL RECTAL
Status: DISCONTINUED | OUTPATIENT
Start: 2021-07-05 | End: 2021-07-07

## 2021-07-06 ENCOUNTER — ANESTHESIA EVENT (OUTPATIENT)
Dept: SURGERY | Facility: HOSPITAL | Age: 66
End: 2021-07-06
Payer: COMMERCIAL

## 2021-07-06 ENCOUNTER — ANESTHESIA (OUTPATIENT)
Dept: SURGERY | Facility: HOSPITAL | Age: 66
End: 2021-07-06
Payer: COMMERCIAL

## 2021-07-06 ENCOUNTER — APPOINTMENT (OUTPATIENT)
Dept: GENERAL RADIOLOGY | Facility: HOSPITAL | Age: 66
End: 2021-07-06
Attending: SURGERY
Payer: COMMERCIAL

## 2021-07-06 LAB
ALBUMIN SERPL-MCNC: 3.1 G/DL (ref 3.4–5)
ALBUMIN/GLOB SERPL: 0.8 {RATIO} (ref 1–2)
ALP LIVER SERPL-CCNC: 89 U/L
ALT SERPL-CCNC: 33 U/L
ANION GAP SERPL CALC-SCNC: 7 MMOL/L (ref 0–18)
AST SERPL-CCNC: 28 U/L (ref 15–37)
BASOPHILS # BLD: 0 X10(3) UL (ref 0–0.2)
BASOPHILS NFR BLD: 0 %
BILIRUB SERPL-MCNC: 0.9 MG/DL (ref 0.1–2)
BUN BLD-MCNC: 10 MG/DL (ref 7–18)
BUN/CREAT SERPL: 15.6 (ref 10–20)
CALCIUM BLD-MCNC: 8 MG/DL (ref 8.5–10.1)
CHLORIDE SERPL-SCNC: 111 MMOL/L (ref 98–112)
CO2 SERPL-SCNC: 24 MMOL/L (ref 21–32)
CREAT BLD-MCNC: 0.64 MG/DL
DEPRECATED HBV CORE AB SER IA-ACNC: 34.3 NG/ML
DEPRECATED RDW RBC AUTO: 55.6 FL (ref 35.1–46.3)
EOSINOPHIL # BLD: 0 X10(3) UL (ref 0–0.7)
EOSINOPHIL NFR BLD: 0 %
ERYTHROCYTE [DISTWIDTH] IN BLOOD BY AUTOMATED COUNT: 17.4 % (ref 11–15)
GLOBULIN PLAS-MCNC: 3.9 G/DL (ref 2.8–4.4)
GLUCOSE BLD-MCNC: 119 MG/DL (ref 70–99)
GLUCOSE BLDC GLUCOMTR-MCNC: 126 MG/DL (ref 70–99)
HAV IGM SER QL: 2 MG/DL (ref 1.6–2.6)
HCT VFR BLD AUTO: 32.1 %
HGB BLD-MCNC: 9.5 G/DL
IRON SATURATION: 12 %
IRON SERPL-MCNC: 39 UG/DL
LYMPHOCYTES NFR BLD: 1.7 X10(3) UL (ref 1–4)
LYMPHOCYTES NFR BLD: 36 %
M PROTEIN MFR SERPL ELPH: 7 G/DL (ref 6.4–8.2)
MCH RBC QN AUTO: 25.8 PG (ref 26–34)
MCHC RBC AUTO-ENTMCNC: 29.6 G/DL (ref 31–37)
MCV RBC AUTO: 87.2 FL
MONOCYTES # BLD: 0.87 X10(3) UL (ref 0.1–1)
MONOCYTES NFR BLD: 19 %
NEUTROPHILS # BLD AUTO: 1.97 X10 (3) UL (ref 1.5–7.7)
NEUTROPHILS NFR BLD: 43 %
NEUTS BAND NFR BLD: 1 %
NEUTS HYPERSEG # BLD: 2.02 X10(3) UL (ref 1.5–7.7)
OSMOLALITY SERPL CALC.SUM OF ELEC: 294 MOSM/KG (ref 275–295)
PLATELET # BLD AUTO: 121 10(3)UL (ref 150–450)
PLATELET MORPHOLOGY: NORMAL
POTASSIUM SERPL-SCNC: 3.5 MMOL/L (ref 3.5–5.1)
RBC # BLD AUTO: 3.68 X10(6)UL
SODIUM SERPL-SCNC: 142 MMOL/L (ref 136–145)
TOTAL CELLS COUNTED: 100
TOTAL IRON BINDING CAPACITY: 332 UG/DL (ref 240–450)
TRANSFERRIN SERPL-MCNC: 223 MG/DL (ref 200–360)
TRANSFERRIN SERPL-MCNC: 223 MG/DL (ref 200–360)
VARIANT LYMPHS NFR BLD MANUAL: 1 %
WBC # BLD AUTO: 4.6 X10(3) UL (ref 4–11)

## 2021-07-06 PROCEDURE — BF13YZZ FLUOROSCOPY OF GALLBLADDER AND BILE DUCTS USING OTHER CONTRAST: ICD-10-PCS | Performed by: SURGERY

## 2021-07-06 PROCEDURE — 84466 ASSAY OF TRANSFERRIN: CPT | Performed by: HOSPITALIST

## 2021-07-06 PROCEDURE — 83540 ASSAY OF IRON: CPT | Performed by: HOSPITALIST

## 2021-07-06 PROCEDURE — 88304 TISSUE EXAM BY PATHOLOGIST: CPT | Performed by: SURGERY

## 2021-07-06 PROCEDURE — 85027 COMPLETE CBC AUTOMATED: CPT | Performed by: HOSPITALIST

## 2021-07-06 PROCEDURE — 96375 TX/PRO/DX INJ NEW DRUG ADDON: CPT

## 2021-07-06 PROCEDURE — 96376 TX/PRO/DX INJ SAME DRUG ADON: CPT

## 2021-07-06 PROCEDURE — 80053 COMPREHEN METABOLIC PANEL: CPT | Performed by: HOSPITALIST

## 2021-07-06 PROCEDURE — 85025 COMPLETE CBC W/AUTO DIFF WBC: CPT | Performed by: HOSPITALIST

## 2021-07-06 PROCEDURE — 82962 GLUCOSE BLOOD TEST: CPT

## 2021-07-06 PROCEDURE — 74300 X-RAY BILE DUCTS/PANCREAS: CPT | Performed by: SURGERY

## 2021-07-06 PROCEDURE — 83735 ASSAY OF MAGNESIUM: CPT | Performed by: HOSPITALIST

## 2021-07-06 PROCEDURE — 85007 BL SMEAR W/DIFF WBC COUNT: CPT | Performed by: HOSPITALIST

## 2021-07-06 PROCEDURE — 82728 ASSAY OF FERRITIN: CPT | Performed by: HOSPITALIST

## 2021-07-06 PROCEDURE — 0FT44ZZ RESECTION OF GALLBLADDER, PERCUTANEOUS ENDOSCOPIC APPROACH: ICD-10-PCS | Performed by: SURGERY

## 2021-07-06 RX ORDER — MORPHINE SULFATE 4 MG/ML
2 INJECTION, SOLUTION INTRAMUSCULAR; INTRAVENOUS EVERY 10 MIN PRN
Status: DISCONTINUED | OUTPATIENT
Start: 2021-07-06 | End: 2021-07-06 | Stop reason: HOSPADM

## 2021-07-06 RX ORDER — SODIUM CHLORIDE, SODIUM LACTATE, POTASSIUM CHLORIDE, CALCIUM CHLORIDE 600; 310; 30; 20 MG/100ML; MG/100ML; MG/100ML; MG/100ML
INJECTION, SOLUTION INTRAVENOUS CONTINUOUS
Status: DISCONTINUED | OUTPATIENT
Start: 2021-07-06 | End: 2021-07-06 | Stop reason: HOSPADM

## 2021-07-06 RX ORDER — HYDROMORPHONE HYDROCHLORIDE 1 MG/ML
0.6 INJECTION, SOLUTION INTRAMUSCULAR; INTRAVENOUS; SUBCUTANEOUS EVERY 5 MIN PRN
Status: DISCONTINUED | OUTPATIENT
Start: 2021-07-06 | End: 2021-07-06 | Stop reason: HOSPADM

## 2021-07-06 RX ORDER — ENOXAPARIN SODIUM 100 MG/ML
40 INJECTION SUBCUTANEOUS DAILY
Status: DISCONTINUED | OUTPATIENT
Start: 2021-07-07 | End: 2021-07-07

## 2021-07-06 RX ORDER — HEPARIN SODIUM 1000 [USP'U]/ML
INJECTION, SOLUTION INTRAVENOUS; SUBCUTANEOUS AS NEEDED
Status: DISCONTINUED | OUTPATIENT
Start: 2021-07-06 | End: 2021-07-06 | Stop reason: HOSPADM

## 2021-07-06 RX ORDER — GLYCOPYRROLATE 0.2 MG/ML
INJECTION, SOLUTION INTRAMUSCULAR; INTRAVENOUS AS NEEDED
Status: DISCONTINUED | OUTPATIENT
Start: 2021-07-06 | End: 2021-07-06 | Stop reason: SURG

## 2021-07-06 RX ORDER — HYDROMORPHONE HYDROCHLORIDE 1 MG/ML
0.4 INJECTION, SOLUTION INTRAMUSCULAR; INTRAVENOUS; SUBCUTANEOUS EVERY 5 MIN PRN
Status: DISCONTINUED | OUTPATIENT
Start: 2021-07-06 | End: 2021-07-06 | Stop reason: HOSPADM

## 2021-07-06 RX ORDER — PROCHLORPERAZINE EDISYLATE 5 MG/ML
5 INJECTION INTRAMUSCULAR; INTRAVENOUS ONCE AS NEEDED
Status: DISCONTINUED | OUTPATIENT
Start: 2021-07-06 | End: 2021-07-06 | Stop reason: HOSPADM

## 2021-07-06 RX ORDER — DEXTROSE MONOHYDRATE 25 G/50ML
50 INJECTION, SOLUTION INTRAVENOUS
Status: DISCONTINUED | OUTPATIENT
Start: 2021-07-06 | End: 2021-07-06 | Stop reason: HOSPADM

## 2021-07-06 RX ORDER — HYDROMORPHONE HYDROCHLORIDE 1 MG/ML
0.2 INJECTION, SOLUTION INTRAMUSCULAR; INTRAVENOUS; SUBCUTANEOUS EVERY 5 MIN PRN
Status: DISCONTINUED | OUTPATIENT
Start: 2021-07-06 | End: 2021-07-06 | Stop reason: HOSPADM

## 2021-07-06 RX ORDER — HYDROCODONE BITARTRATE AND ACETAMINOPHEN 5; 325 MG/1; MG/1
1 TABLET ORAL AS NEEDED
Status: DISCONTINUED | OUTPATIENT
Start: 2021-07-06 | End: 2021-07-06 | Stop reason: HOSPADM

## 2021-07-06 RX ORDER — PHENYLEPHRINE HCL 10 MG/ML
VIAL (ML) INJECTION AS NEEDED
Status: DISCONTINUED | OUTPATIENT
Start: 2021-07-06 | End: 2021-07-06 | Stop reason: SURG

## 2021-07-06 RX ORDER — TRAMADOL HYDROCHLORIDE 50 MG/1
50 TABLET ORAL EVERY 6 HOURS PRN
Status: DISCONTINUED | OUTPATIENT
Start: 2021-07-06 | End: 2021-07-06

## 2021-07-06 RX ORDER — DEXAMETHASONE SODIUM PHOSPHATE 4 MG/ML
VIAL (ML) INJECTION AS NEEDED
Status: DISCONTINUED | OUTPATIENT
Start: 2021-07-06 | End: 2021-07-06 | Stop reason: SURG

## 2021-07-06 RX ORDER — HALOPERIDOL 5 MG/ML
0.25 INJECTION INTRAMUSCULAR ONCE AS NEEDED
Status: DISCONTINUED | OUTPATIENT
Start: 2021-07-06 | End: 2021-07-06 | Stop reason: HOSPADM

## 2021-07-06 RX ORDER — NALOXONE HYDROCHLORIDE 0.4 MG/ML
80 INJECTION, SOLUTION INTRAMUSCULAR; INTRAVENOUS; SUBCUTANEOUS AS NEEDED
Status: DISCONTINUED | OUTPATIENT
Start: 2021-07-06 | End: 2021-07-06 | Stop reason: HOSPADM

## 2021-07-06 RX ORDER — MIDAZOLAM HYDROCHLORIDE 1 MG/ML
INJECTION INTRAMUSCULAR; INTRAVENOUS AS NEEDED
Status: DISCONTINUED | OUTPATIENT
Start: 2021-07-06 | End: 2021-07-06 | Stop reason: SURG

## 2021-07-06 RX ORDER — ROCURONIUM BROMIDE 10 MG/ML
INJECTION, SOLUTION INTRAVENOUS AS NEEDED
Status: DISCONTINUED | OUTPATIENT
Start: 2021-07-06 | End: 2021-07-06 | Stop reason: SURG

## 2021-07-06 RX ORDER — NEOSTIGMINE METHYLSULFATE 1 MG/ML
INJECTION INTRAVENOUS AS NEEDED
Status: DISCONTINUED | OUTPATIENT
Start: 2021-07-06 | End: 2021-07-06 | Stop reason: SURG

## 2021-07-06 RX ORDER — HYDROCODONE BITARTRATE AND ACETAMINOPHEN 5; 325 MG/1; MG/1
2 TABLET ORAL AS NEEDED
Status: DISCONTINUED | OUTPATIENT
Start: 2021-07-06 | End: 2021-07-06 | Stop reason: HOSPADM

## 2021-07-06 RX ORDER — ONDANSETRON 2 MG/ML
4 INJECTION INTRAMUSCULAR; INTRAVENOUS ONCE AS NEEDED
Status: DISCONTINUED | OUTPATIENT
Start: 2021-07-06 | End: 2021-07-06 | Stop reason: HOSPADM

## 2021-07-06 RX ORDER — MORPHINE SULFATE 10 MG/ML
6 INJECTION, SOLUTION INTRAMUSCULAR; INTRAVENOUS EVERY 10 MIN PRN
Status: DISCONTINUED | OUTPATIENT
Start: 2021-07-06 | End: 2021-07-06 | Stop reason: HOSPADM

## 2021-07-06 RX ORDER — ONDANSETRON 2 MG/ML
INJECTION INTRAMUSCULAR; INTRAVENOUS AS NEEDED
Status: DISCONTINUED | OUTPATIENT
Start: 2021-07-06 | End: 2021-07-06 | Stop reason: SURG

## 2021-07-06 RX ORDER — BUPIVACAINE HYDROCHLORIDE AND EPINEPHRINE 2.5; 5 MG/ML; UG/ML
INJECTION, SOLUTION INFILTRATION; PERINEURAL AS NEEDED
Status: DISCONTINUED | OUTPATIENT
Start: 2021-07-06 | End: 2021-07-06 | Stop reason: HOSPADM

## 2021-07-06 RX ORDER — LIDOCAINE HYDROCHLORIDE 10 MG/ML
INJECTION, SOLUTION EPIDURAL; INFILTRATION; INTRACAUDAL; PERINEURAL AS NEEDED
Status: DISCONTINUED | OUTPATIENT
Start: 2021-07-06 | End: 2021-07-06 | Stop reason: SURG

## 2021-07-06 RX ORDER — ATORVASTATIN CALCIUM 40 MG/1
40 TABLET, FILM COATED ORAL DAILY
Status: DISCONTINUED | OUTPATIENT
Start: 2021-07-07 | End: 2021-07-07

## 2021-07-06 RX ORDER — MORPHINE SULFATE 4 MG/ML
4 INJECTION, SOLUTION INTRAMUSCULAR; INTRAVENOUS EVERY 10 MIN PRN
Status: DISCONTINUED | OUTPATIENT
Start: 2021-07-06 | End: 2021-07-06 | Stop reason: HOSPADM

## 2021-07-06 RX ADMIN — SODIUM CHLORIDE: 9 INJECTION, SOLUTION INTRAVENOUS at 16:08:00

## 2021-07-06 RX ADMIN — MIDAZOLAM HYDROCHLORIDE 2 MG: 1 INJECTION INTRAMUSCULAR; INTRAVENOUS at 16:10:00

## 2021-07-06 RX ADMIN — LIDOCAINE HYDROCHLORIDE 50 MG: 10 INJECTION, SOLUTION EPIDURAL; INFILTRATION; INTRACAUDAL; PERINEURAL at 16:19:00

## 2021-07-06 RX ADMIN — SODIUM CHLORIDE: 9 INJECTION, SOLUTION INTRAVENOUS at 16:07:00

## 2021-07-06 RX ADMIN — ONDANSETRON 4 MG: 2 INJECTION INTRAMUSCULAR; INTRAVENOUS at 16:25:00

## 2021-07-06 RX ADMIN — SODIUM CHLORIDE: 9 INJECTION, SOLUTION INTRAVENOUS at 16:59:00

## 2021-07-06 RX ADMIN — DEXAMETHASONE SODIUM PHOSPHATE 4 MG: 4 MG/ML VIAL (ML) INJECTION at 16:25:00

## 2021-07-06 RX ADMIN — PHENYLEPHRINE HCL 200 MCG: 10 MG/ML VIAL (ML) INJECTION at 16:35:00

## 2021-07-06 RX ADMIN — ROCURONIUM BROMIDE 40 MG: 10 INJECTION, SOLUTION INTRAVENOUS at 16:20:00

## 2021-07-06 RX ADMIN — SODIUM CHLORIDE: 9 INJECTION, SOLUTION INTRAVENOUS at 16:39:00

## 2021-07-06 RX ADMIN — NEOSTIGMINE METHYLSULFATE 3 MG: 1 INJECTION INTRAVENOUS at 17:40:00

## 2021-07-06 RX ADMIN — GLYCOPYRROLATE 0.6 MG: 0.2 INJECTION, SOLUTION INTRAMUSCULAR; INTRAVENOUS at 17:40:00

## 2021-07-06 NOTE — CONSULTS
Paxton Holm  HNR:[id number]  FTA:[id number]  LOS:1    Date of Admission:  7/5/2021  Date of Consult:  7/6/2021 7/7/2021] atorvastatin (LIPITOR) tab 40 mg, 40 mg, Oral, Daily  •  Piperacillin Sod-Tazobactam So (ZOSYN) 3.375 g in dextrose 5% 100 mL IVPB-ADDV, 3.375 g, Intravenous, Q8H  •  0.9% NaCl infusion, , Intravenous, Continuous  •  acetaminophen (TYLENOL) tab 6 comfortable, in good spirits and uncomfortable  Pulmonary:lungs clear to auscultation bilaterally  Cardiac: RRR, nl S1,S2, no S3, no S4,  no murmurs and no ectopy  Abdomen: normal BS, soft, nondistended and moderate tenderness RUQ, no guarding  Rectal exam left upper quadrant small bowel resection with anastomosis for 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 No suspicious findings at the anastomotic site when accounting for limitations  of noncontrast CT technique.  4. Smal

## 2021-07-06 NOTE — PLAN OF CARE
Reinier Glass is aware of POC at this time. Plan for lap cesar this afternoon. She is currently NPO. IVF infusing no complaints of pain at this time.      Problem: Patient Centered Care  Goal: Patient preferences are identified and integrated in the patient's plan

## 2021-07-06 NOTE — ED PROVIDER NOTES
Patient Seen in: [facility] AND [facility] Emergency Department      History   Patient presents with:  Abdomen/Flank Pain    Stated Complaint: n/v, back pain     HPI/Subjective:   HPI    82-year-old female with past medical history significant for high cholester 07/05/21 1820 135/70   Pulse 07/05/21 1820 97   Resp 07/05/21 1820 18   Temp 07/05/21 1820 98 °F (36.7 °C)   Temp src --    SpO2 07/05/21 1820 98 %   O2 Device 07/05/21 1910 None (Room air)       Current:/64   Pulse 69   Temp 98 °F (36.7 °C)   Resp 1 components within normal limits   LIPASE - Normal   REDRAW AST (SGOT) (P) - Normal   REDRAW POTASSIUM (P) - Normal   CBC WITH DIFFERENTIAL WITH PLATELET    Narrative:      The following orders were created for panel order CBC With Differential With Platelet verified by the Radiologist whose name is printed above. DD:  07/05/2021/DT:  07/05/2021           MDM      Patient has acute cholecystitis. She has normal lab work at this time but CT shows findings consistent with acute cholecystitis.   She remains he

## 2021-07-06 NOTE — PROGRESS NOTES
Pt arrived via transport. A/o x 4. On room air. VS stable.   /69 (BP Location: Right arm)   Pulse 73   Temp 98.6 °F (37 °C) (Oral)   Resp 18   Ht 165.1 cm (5' 5\")   Wt 68.9 kg (152 lb)   LMP 01/01/2008 (Approximate)   SpO2 98%   BMI 25.29 kg/m²   Pt

## 2021-07-06 NOTE — OPERATIVE REPORT
[facility] OPERATING ROOM  OPERATIVE REPORT:     PATIENT NAME: Tracey Klein  : 1955    CSN: [id number]     DATE OF OPERATION:   21    PREOPERATIVE DIAGNOSIS: Acute Cholecystitis, Cholelithiasis    POSTOPERATIVE DIAGNOSIS: Acut removed and the cystic duct was clipped 3 times below the ductotomy and transected. The cystic artery was clipped and transected and the gallbladder was removed from the liver bed using blunt dissection and electrocautery.   The gallbladder was placed in s

## 2021-07-06 NOTE — ANESTHESIA PREPROCEDURE EVALUATION
Anesthesia PreOp Note    HPI:     Fawn Antoine is a 63year old female who presents for preoperative consultation requested by: Davide Severino MD    Date of Surgery: 7/5/2021 - 7/6/2021    Procedure(s):  LAPAROSCOPIC CHOLECYSTECTOMY POSSIBLE I 90 tablet, Rfl: 3, 7/5/2021 at Unknown time  atorvastatin 40 MG Oral Tab, Take 1 tablet (40 mg total) by mouth daily. , Disp: 90 tablet, Rfl: 3, 7/4/2021 at Unknown time      [facility] Hold] traMADol HCl (ULTRAM) tab 50 mg, 50 mg, Oral, Q6H PRN, Jordana Bennett MD  [ (COMPAZINE) injection 5 mg, 5 mg, Intravenous, Q8H PRN, Prabha Case, Antonio Graves MD    No current [facility]-ordered outpatient medications on file.       No Known Allergies    Family History   Problem Relation Age of Onset   • Heart Disease Father          Emotionally Abused:       Physically Abused:       Sexually Abused:     Available pre-op labs reviewed.   Lab Results   Component Value Date    WBC 4.6 07/06/2021    RBC 3.68 (L) 07/06/2021    HGB 9.5 (L) 07/06/2021    HCT 32.1 (L) 07/06/2021    MCV 87.2 07 of the patient's questions were answered to the best of my ability. The patient desires the anesthetic management as planned.   KANNAN ORONA  7/6/2021 3:12 PM

## 2021-07-06 NOTE — ED QUICK NOTES
Orders for admission, patient is aware of plan and ready to go upstairs. Any questions, please call ED  [facility]  at extension [phone]. Type of COVID test sent:NONE pt, had her 2 doses of COVID shots, pt is asymptomatic.   COVID Suspicion level: Low/High

## 2021-07-06 NOTE — ANESTHESIA POSTPROCEDURE EVALUATION
Patient: Tammy Perkins Wojtas    Procedure Summary     Date: 07/06/21 Room / Location: [address] [place] [address] MAIN OR 03 / [facility] MAIN OR    Anesthesia Start: 1637 Anesthesia Stop:     Procedure: [id number] with INTRAOPERATIVE CHOLANGIOGRAM (N/A ) Diagnosis:

## 2021-07-06 NOTE — ANESTHESIA PROCEDURE NOTES
Airway  Date/Time: 7/6/2021 4:21 PM  Urgency: Elective      General Information and Staff    Patient location during procedure: OR  Anesthesiologist: Roslyn Ewing MD  Resident/CRNA: Miriam Sosa CRNA  Performed: CRNA     Indications and Patient Conditi

## 2021-07-07 VITALS
RESPIRATION RATE: 18 BRPM | BODY MASS INDEX: 25.33 KG/M2 | HEART RATE: 71 BPM | TEMPERATURE: 99 F | DIASTOLIC BLOOD PRESSURE: 55 MMHG | HEIGHT: 65 IN | SYSTOLIC BLOOD PRESSURE: 107 MMHG | OXYGEN SATURATION: 93 % | WEIGHT: 152 LBS

## 2021-07-07 LAB
ALBUMIN SERPL-MCNC: 2.7 G/DL (ref 3.4–5)
ALBUMIN/GLOB SERPL: 0.7 {RATIO} (ref 1–2)
ALP LIVER SERPL-CCNC: 73 U/L
ALT SERPL-CCNC: 27 U/L
ANION GAP SERPL CALC-SCNC: 7 MMOL/L (ref 0–18)
AST SERPL-CCNC: 21 U/L (ref 15–37)
BASOPHILS # BLD AUTO: 0 X10(3) UL (ref 0–0.2)
BASOPHILS NFR BLD AUTO: 0 %
BILIRUB SERPL-MCNC: 0.6 MG/DL (ref 0.1–2)
BUN BLD-MCNC: 9 MG/DL (ref 7–18)
BUN/CREAT SERPL: 13.8 (ref 10–20)
CALCIUM BLD-MCNC: 8.4 MG/DL (ref 8.5–10.1)
CHLORIDE SERPL-SCNC: 113 MMOL/L (ref 98–112)
CO2 SERPL-SCNC: 23 MMOL/L (ref 21–32)
CREAT BLD-MCNC: 0.65 MG/DL
DEPRECATED RDW RBC AUTO: 58.1 FL (ref 35.1–46.3)
EOSINOPHIL # BLD AUTO: 0 X10(3) UL (ref 0–0.7)
EOSINOPHIL NFR BLD AUTO: 0 %
ERYTHROCYTE [DISTWIDTH] IN BLOOD BY AUTOMATED COUNT: 17.7 % (ref 11–15)
GLOBULIN PLAS-MCNC: 4.1 G/DL (ref 2.8–4.4)
GLUCOSE BLD-MCNC: 130 MG/DL (ref 70–99)
HCT VFR BLD AUTO: 30.5 %
HGB BLD-MCNC: 8.9 G/DL
IMM GRANULOCYTES # BLD AUTO: 0 X10(3) UL (ref 0–1)
IMM GRANULOCYTES NFR BLD: 0 %
LYMPHOCYTES # BLD AUTO: 1.4 X10(3) UL (ref 1–4)
LYMPHOCYTES NFR BLD AUTO: 31.4 %
M PROTEIN MFR SERPL ELPH: 6.8 G/DL (ref 6.4–8.2)
MCH RBC QN AUTO: 26.2 PG (ref 26–34)
MCHC RBC AUTO-ENTMCNC: 29.2 G/DL (ref 31–37)
MCV RBC AUTO: 89.7 FL
MONOCYTES # BLD AUTO: 0.36 X10(3) UL (ref 0.1–1)
MONOCYTES NFR BLD AUTO: 8.1 %
NEUTROPHILS # BLD AUTO: 2.7 X10 (3) UL (ref 1.5–7.7)
NEUTROPHILS # BLD AUTO: 2.7 X10(3) UL (ref 1.5–7.7)
NEUTROPHILS NFR BLD AUTO: 60.5 %
OSMOLALITY SERPL CALC.SUM OF ELEC: 296 MOSM/KG (ref 275–295)
PLATELET # BLD AUTO: 119 10(3)UL (ref 150–450)
POTASSIUM SERPL-SCNC: 3.9 MMOL/L (ref 3.5–5.1)
RBC # BLD AUTO: 3.4 X10(6)UL
SODIUM SERPL-SCNC: 143 MMOL/L (ref 136–145)
WBC # BLD AUTO: 4.5 X10(3) UL (ref 4–11)

## 2021-07-07 PROCEDURE — 96372 THER/PROPH/DIAG INJ SC/IM: CPT

## 2021-07-07 PROCEDURE — 85025 COMPLETE CBC W/AUTO DIFF WBC: CPT | Performed by: SURGERY

## 2021-07-07 PROCEDURE — 80053 COMPREHEN METABOLIC PANEL: CPT | Performed by: SURGERY

## 2021-07-07 RX ORDER — HYDROCODONE BITARTRATE AND ACETAMINOPHEN 5; 325 MG/1; MG/1
1 TABLET ORAL EVERY 6 HOURS PRN
Qty: 10 TABLET | Refills: 0 | Status: SHIPPED | OUTPATIENT
Start: 2021-07-07 | End: 2021-08-09

## 2021-07-07 RX ORDER — AMOXICILLIN AND CLAVULANATE POTASSIUM 875; 125 MG/1; MG/1
1 TABLET, FILM COATED ORAL 2 TIMES DAILY
Qty: 10 TABLET | Refills: 0 | Status: SHIPPED | OUTPATIENT
Start: 2021-07-07 | End: 2021-07-12

## 2021-07-07 NOTE — PROGRESS NOTES
[facility] \[facility]\"" - [facility]    General Surgery Progress Note  Urvashitrinidad Nuñez Valeria  : 1955  CSN: [id number]  HD# 1    Subjective:   POD#1 laparoscopic cholecystectomy   No unusual complaints mild incisional pain as expected, denies abdominal pain, NEPRELIM 2.01 1.97 2.70   WBC 4.7 4.6 4.5   .0* 121.0* 119.0*       Recent Labs   Lab 07/05/21  1930 07/05/21 2031 07/06/21  0518 07/07/21  0534   *  --  119* 130*   BUN 13  --  10 9   CREATSERUM 0.71  --  0.64 0.65   GFRAA 105  --  110 10 accounting for limitations  of noncontrast CT technique. 4. Small retrocardiac hiatal hernia. 5. Stable probable hepatic cysts. 6. Coronary and peripheral atherosclerosis. 7. Lesser incidental findings as above.      A preliminary report was issued by the V

## 2021-07-07 NOTE — PLAN OF CARE
Pt is alert and oriented x4. Complaining of mild pain, declining the need for medication. IV fluids infusing. Tolerating general diet, no nausea/vomiting. Up independently, ambulating frequently.  Call light within reach, plan is to discharge to home this a Manage/alleviate anxiety  - Utilize distraction and/or relaxation techniques  - Monitor for opioid side effects  - Notify MD/LIP if interventions unsuccessful or patient reports new pain  - Anticipate increased pain with activity and pre-medicate as approp urinary retention  Description: INTERVENTIONS:  - Assess patient’s ability to void and empty bladder  - Monitor intake/output and perform bladder scan as needed  - Follow urinary retention protocol/standard of care  - Consider collaborating with pharmacy t

## 2021-07-07 NOTE — DISCHARGE SUMMARY
General Medicine Discharge Summary     Patient ID:  Peggy Lisa Wojtas  63year old  11/24/1955    Admit date: 7/5/2021    Discharge date and time: 7/7/2021    Attending Physician: Bc Amador MD     Consults: IP CONSULT TO HOSPITALIST  IP CONSULT TO GEN surgery  -Follow-up with surgery in 1 to 2 weeks.     Anemia  - hgb 9-10  - with low iron  - oral iron as outpatient, once recovered from surgery  - FU with PCP regarding supplement     Thrombocytopenia   - plts 108-121  - monitor as outpatient      HLD  - PS  Dictated by (CST): Graceann Duane, MD on 7/06/2021 at 7:41 AM     Finalized by (CST): Graceann Duane, MD on 7/06/2021 at 7:52 AM              Disposition: home    Activity: activity as tolerated  Diet: adv diet  Wound Care: as directed  Code Status:  Fu hormone therapy for 1 week post surgery    Discharge Instructions Laparoscopic Cholcystectomy    Diet:    Eat light foods tonight and resume normal diet tomorrow. If you develop nausea, stick to liquids until the nausea goes away.     Activity:    Rest tod operative site away from the shower head. The tape bandage may be removed in 3 days and leave the [employer] \"butterfly\" tapes intact. You may continue to shower after that.   If skin glue was used,  you do not need to remove it and you may shower the da

## 2021-07-07 NOTE — PLAN OF CARE
A&Ox4, weaned off O2, room air, VSS. Ambulating with standby assist. IVFs and IV Zosyn as ordered. Voiding. Lap sites - LOLA, CDI. General diet. Denies n/v. No c/o moderate-severe pain. Fall precautions in place. Call light within reach.      Problem: PAIN -

## 2021-08-16 ENCOUNTER — HOSPITAL ENCOUNTER (OUTPATIENT)
Dept: CT IMAGING | Facility: HOSPITAL | Age: 66
Discharge: HOME | End: 2021-08-16
Attending: NURSE PRACTITIONER
Payer: COMMERCIAL

## 2021-08-16 VITALS
SYSTOLIC BLOOD PRESSURE: 125 MMHG | WEIGHT: 150 LBS | HEART RATE: 86 BPM | OXYGEN SATURATION: 98 % | HEIGHT: 65 IN | RESPIRATION RATE: 15 BRPM | BODY MASS INDEX: 24.99 KG/M2 | DIASTOLIC BLOOD PRESSURE: 60 MMHG

## 2021-08-16 DIAGNOSIS — D61.818 PANCYTOPENIA (HCC): Primary | ICD-10-CM

## 2021-08-16 DIAGNOSIS — D21.4 GIST (GASTROINTESTINAL STROMAL TUMOR), NON-MALIGNANT: ICD-10-CM

## 2021-08-16 PROCEDURE — 88184 FLOWCYTOMETRY/ TC 1 MARKER: CPT | Performed by: RADIOLOGY

## 2021-08-16 PROCEDURE — 38221 DX BONE MARROW BIOPSIES: CPT | Performed by: NURSE PRACTITIONER

## 2021-08-16 PROCEDURE — 88311 DECALCIFY TISSUE: CPT | Performed by: NURSE PRACTITIONER

## 2021-08-16 PROCEDURE — 99152 MOD SED SAME PHYS/QHP 5/>YRS: CPT | Performed by: NURSE PRACTITIONER

## 2021-08-16 PROCEDURE — 77012 CT SCAN FOR NEEDLE BIOPSY: CPT | Performed by: NURSE PRACTITIONER

## 2021-08-16 PROCEDURE — 88189 FLOWCYTOMETRY/READ 16 & >: CPT | Performed by: RADIOLOGY

## 2021-08-16 PROCEDURE — 88185 FLOWCYTOMETRY/TC ADD-ON: CPT | Performed by: RADIOLOGY

## 2021-08-16 PROCEDURE — 85097 BONE MARROW INTERPRETATION: CPT | Performed by: NURSE PRACTITIONER

## 2021-08-16 PROCEDURE — 88305 TISSUE EXAM BY PATHOLOGIST: CPT | Performed by: NURSE PRACTITIONER

## 2021-08-16 PROCEDURE — 88275 CYTOGENETICS 100-300: CPT | Performed by: RADIOLOGY

## 2021-08-16 PROCEDURE — 88237 TISSUE CULTURE BONE MARROW: CPT | Performed by: RADIOLOGY

## 2021-08-16 PROCEDURE — 88313 SPECIAL STAINS GROUP 2: CPT | Performed by: NURSE PRACTITIONER

## 2021-08-16 PROCEDURE — 88291 CYTO/MOLECULAR REPORT: CPT | Performed by: RADIOLOGY

## 2021-08-16 PROCEDURE — 88271 CYTOGENETICS DNA PROBE: CPT | Performed by: RADIOLOGY

## 2021-08-16 PROCEDURE — 88342 IMHCHEM/IMCYTCHM 1ST ANTB: CPT | Performed by: NURSE PRACTITIONER

## 2021-08-16 PROCEDURE — 88264 CHROMOSOME ANALYSIS 20-25: CPT | Performed by: RADIOLOGY

## 2021-08-16 PROCEDURE — 88360 TUMOR IMMUNOHISTOCHEM/MANUAL: CPT | Performed by: NURSE PRACTITIONER

## 2021-08-16 PROCEDURE — 88341 IMHCHEM/IMCYTCHM EA ADD ANTB: CPT | Performed by: NURSE PRACTITIONER

## 2021-08-16 RX ORDER — FLUMAZENIL 0.1 MG/ML
0.2 INJECTION, SOLUTION INTRAVENOUS AS NEEDED
Status: DISCONTINUED | OUTPATIENT
Start: 2021-08-16 | End: 2021-08-18

## 2021-08-16 RX ORDER — MIDAZOLAM HYDROCHLORIDE 1 MG/ML
INJECTION INTRAMUSCULAR; INTRAVENOUS
Status: COMPLETED
Start: 2021-08-16 | End: 2021-08-16

## 2021-08-16 RX ORDER — NALOXONE HYDROCHLORIDE 0.4 MG/ML
80 INJECTION, SOLUTION INTRAMUSCULAR; INTRAVENOUS; SUBCUTANEOUS AS NEEDED
Status: DISCONTINUED | OUTPATIENT
Start: 2021-08-16 | End: 2021-08-18

## 2021-08-16 RX ORDER — MIDAZOLAM HYDROCHLORIDE 1 MG/ML
1 INJECTION INTRAMUSCULAR; INTRAVENOUS EVERY 5 MIN PRN
Status: DISCONTINUED | OUTPATIENT
Start: 2021-08-16 | End: 2021-08-18

## 2021-08-16 RX ORDER — SODIUM CHLORIDE 9 MG/ML
INJECTION, SOLUTION INTRAVENOUS CONTINUOUS
Status: DISCONTINUED | OUTPATIENT
Start: 2021-08-16 | End: 2021-08-18

## 2021-08-16 RX ORDER — HEPARIN SODIUM 1000 [USP'U]/ML
INJECTION, SOLUTION INTRAVENOUS; SUBCUTANEOUS
Status: DISPENSED
Start: 2021-08-16 | End: 2021-08-16

## 2021-08-16 RX ADMIN — MIDAZOLAM HYDROCHLORIDE 2 MG: 1 INJECTION INTRAMUSCULAR; INTRAVENOUS at 09:55:00

## 2021-08-16 RX ADMIN — MIDAZOLAM HYDROCHLORIDE 1 MG: 1 INJECTION INTRAMUSCULAR; INTRAVENOUS at 10:01:00

## 2021-08-16 NOTE — IMAGING NOTE
1007  Bone marrow biopsy complete. Site cleaned, sterile dressing applied. VSS. Patient transferred to cart and taken to rad holding with daughter. 1015 Monitor applied, VSS. Dressing CDI. Denies pain. Nourishment provided.  Call light within reach; paulino

## 2021-08-17 LAB
CD10 CELLS NFR SPEC: <1 %
CD10 CELLS NFR SPEC: <1 %
CD117 CELLS NFR SPEC: 90 %
CD11C CELLS NFR SPEC: 6 %
CD11C CELLS NFR SPEC: 6 %
CD13 CELLS NFR SPEC: 5 %
CD14 CELLS NFR SPEC: 1 %
CD14 CELLS NFR SPEC: 1 %
CD19 CELLS NFR SPEC: 5 %
CD19 CELLS NFR SPEC: 5 %
CD19/CD10 CELLS: <1 %
CD19/CD10 CELLS: <1 %
CD20 CELLS NFR SPEC: 4 %
CD20 CELLS NFR SPEC: 4 %
CD22 CELLS NFR SPEC: 4 %
CD23 CELLS NFR SPEC: 2 %
CD3 CELLS NFR SPEC: 60 %
CD3 CELLS NFR SPEC: 60 %
CD3+CD4+ CELLS NFR SPEC: 34 %
CD3+CD4+ CELLS/CD3+CD8+ CLL SPEC: 1.3
CD3+CD8+ CELLS NFR SPEC: 26 %
CD33 CELLS NFR SPEC: 65 %
CD33/CD34 CELLS: 53 %
CD34 CELLS NFR SPEC: 90 %
CD45 CELLS NFR SPEC: 98 %
CD45 CELLS NFR SPEC: 98 %
CD45-/CD38+ KAPPA: 62 %
CD45-/CD38+ LAMBDA: 29 %
CD5 CELLS NFR SPEC: 59 %
CD5 CELLS NFR SPEC: 59 %
CD5/CD19 CELLS: <1 %
CD56 CELLS NFR SPEC: 14 %
CD56 CELLS NFR SPEC: 14 %
CD56 CELLS NFR SPEC: <1 %
CD61 CELLS NFR SPEC: 6 %
CD64 CELLS NFR SPEC: 3 %
CD7 CELLS NFR SPEC: 46 %
CD7 CELLS NFR SPEC: 46 %
CELL SURF KAPPA/LAMBDA RATIO: 1
CELL SURF KAPPA/LAMBDA RATIO: 1
CELL SURF LAMBDA LIGHT CHAIN: 2 %
CELL SURF LAMBDA LIGHT CHAIN: 2 %
CELL SURFACE KAPPA LIGHT CHAIN: 2 %
CELL SURFACE KAPPA LIGHT CHAIN: 2 %
FMC7 CELLS NFR SPEC: 3 %
HLA-DR+ CELLS NFR SPEC: 90 %
MPO CELLS: 89 %

## 2021-08-17 NOTE — H&P
History & Physical Examination    Patient Name: Renetta Alegria  MRN: [id number]  [facility]: [id number]  YOB: 1955    Diagnosis: Neutropenia, anemia    Present Illness: 47-year-old with neutropenia, anemia, pulmonary biopsy requested by cons [x ]    ABDOMEN [x ] [x ]                        [ x ] I have discussed the risks and benefits and alternatives with the patient/family. They understand and agree to proceed with plan of care.   [ x ] I have reviewed the History and Physical done within th

## 2021-08-23 PROBLEM — D46.Z MDS (MYELODYSPLASTIC SYNDROME), HIGH GRADE (HCC): Status: ACTIVE | Noted: 2021-08-23

## 2021-08-30 ENCOUNTER — HOSPITAL ENCOUNTER (EMERGENCY)
Facility: HOSPITAL | Age: 66
Discharge: HOME | End: 2021-08-30
Attending: EMERGENCY MEDICINE
Payer: COMMERCIAL

## 2021-08-30 VITALS
SYSTOLIC BLOOD PRESSURE: 113 MMHG | WEIGHT: 150 LBS | HEIGHT: 65 IN | TEMPERATURE: 98 F | DIASTOLIC BLOOD PRESSURE: 64 MMHG | OXYGEN SATURATION: 99 % | RESPIRATION RATE: 16 BRPM | BODY MASS INDEX: 24.99 KG/M2 | HEART RATE: 91 BPM

## 2021-08-30 DIAGNOSIS — R11.2 CHEMOTHERAPY INDUCED NAUSEA AND VOMITING: Primary | ICD-10-CM

## 2021-08-30 DIAGNOSIS — T45.1X5A CHEMOTHERAPY INDUCED NAUSEA AND VOMITING: Primary | ICD-10-CM

## 2021-08-30 LAB
ANION GAP SERPL CALC-SCNC: 7 MMOL/L (ref 0–18)
BASOPHILS # BLD AUTO: 0.01 X10(3) UL (ref 0–0.2)
BASOPHILS NFR BLD AUTO: 0.4 %
BUN BLD-MCNC: 17 MG/DL (ref 7–18)
BUN/CREAT SERPL: 20 (ref 10–20)
CALCIUM BLD-MCNC: 9.3 MG/DL (ref 8.5–10.1)
CHLORIDE SERPL-SCNC: 110 MMOL/L (ref 98–112)
CO2 SERPL-SCNC: 25 MMOL/L (ref 21–32)
CREAT BLD-MCNC: 0.85 MG/DL
DEPRECATED RDW RBC AUTO: 54.1 FL (ref 35.1–46.3)
EOSINOPHIL # BLD AUTO: 0.01 X10(3) UL (ref 0–0.7)
EOSINOPHIL NFR BLD AUTO: 0.4 %
ERYTHROCYTE [DISTWIDTH] IN BLOOD BY AUTOMATED COUNT: 17.5 % (ref 11–15)
GLUCOSE BLD-MCNC: 122 MG/DL (ref 70–99)
HAV IGM SER QL: 2.2 MG/DL (ref 1.6–2.6)
HCT VFR BLD AUTO: 35.9 %
HGB BLD-MCNC: 10.3 G/DL
IMM GRANULOCYTES # BLD AUTO: 0.02 X10(3) UL (ref 0–1)
IMM GRANULOCYTES NFR BLD: 0.7 %
LYMPHOCYTES # BLD AUTO: 1.33 X10(3) UL (ref 1–4)
LYMPHOCYTES NFR BLD AUTO: 49.6 %
MCH RBC QN AUTO: 24.5 PG (ref 26–34)
MCHC RBC AUTO-ENTMCNC: 28.7 G/DL (ref 31–37)
MCV RBC AUTO: 85.5 FL
MONOCYTES # BLD AUTO: 0.13 X10(3) UL (ref 0.1–1)
MONOCYTES NFR BLD AUTO: 4.9 %
NEUTROPHILS # BLD AUTO: 1.18 X10 (3) UL (ref 1.5–7.7)
NEUTROPHILS # BLD AUTO: 1.18 X10(3) UL (ref 1.5–7.7)
NEUTROPHILS NFR BLD AUTO: 44 %
OSMOLALITY SERPL CALC.SUM OF ELEC: 297 MOSM/KG (ref 275–295)
PLATELET # BLD AUTO: 153 10(3)UL (ref 150–450)
PLATELET MORPHOLOGY: NORMAL
POTASSIUM SERPL-SCNC: 3.7 MMOL/L (ref 3.5–5.1)
RBC # BLD AUTO: 4.2 X10(6)UL
SODIUM SERPL-SCNC: 142 MMOL/L (ref 136–145)
WBC # BLD AUTO: 2.7 X10(3) UL (ref 4–11)

## 2021-08-30 PROCEDURE — 80048 BASIC METABOLIC PNL TOTAL CA: CPT | Performed by: EMERGENCY MEDICINE

## 2021-08-30 PROCEDURE — 99284 EMERGENCY DEPT VISIT MOD MDM: CPT

## 2021-08-30 PROCEDURE — 96361 HYDRATE IV INFUSION ADD-ON: CPT

## 2021-08-30 PROCEDURE — 96374 THER/PROPH/DIAG INJ IV PUSH: CPT

## 2021-08-30 PROCEDURE — 83735 ASSAY OF MAGNESIUM: CPT | Performed by: EMERGENCY MEDICINE

## 2021-08-30 PROCEDURE — 85025 COMPLETE CBC W/AUTO DIFF WBC: CPT | Performed by: EMERGENCY MEDICINE

## 2021-08-30 RX ORDER — ONDANSETRON 2 MG/ML
4 INJECTION INTRAMUSCULAR; INTRAVENOUS ONCE
Status: COMPLETED | OUTPATIENT
Start: 2021-08-30 | End: 2021-08-30

## 2021-08-30 RX ORDER — ONDANSETRON 4 MG/1
4 TABLET, ORALLY DISINTEGRATING ORAL EVERY 4 HOURS PRN
Qty: 30 TABLET | Refills: 0 | Status: SHIPPED | OUTPATIENT
Start: 2021-08-30 | End: 2021-09-06

## 2021-08-30 NOTE — ED INITIAL ASSESSMENT (HPI)
PT came in for continued vomiting after first chemo treatment today. Took prochlorperazine at home with no relief. RR even and nonlabored, speaking in full sentences, ambulatory with steady gait.

## 2021-08-30 NOTE — ED PROVIDER NOTES
Patient Seen in: [facility] AND [facility] Emergency Department      History   Patient presents with:  Chemotherapy    Stated Complaint: vomiting, chemo treatment started recently     HPI/Subjective:   HPI    61-year-old female with past medical history signific Triage Vitals [08/30/21 1800]   /72   Pulse 104   Resp 16   Temp 98.4 °F (36.9 °C)   Temp src Oral   SpO2 97 %   O2 Device None (Room air)       Current:/64   Pulse 91   Temp 98.4 °F (36.9 °C) (Oral)   Resp 16   Ht 165.1 cm (5' 5\")   Wt 68 kg Platelet.   Procedure                               Abnormality         Status                     ---------                               -----------         ------                     CBC W/ DIFFERENTIAL[[id number]]          Abnormal            Final resul

## 2021-10-18 ENCOUNTER — ORDER TRANSCRIPTION (OUTPATIENT)
Dept: ADMINISTRATIVE | Facility: HOSPITAL | Age: 66
End: 2021-10-18

## 2021-10-18 DIAGNOSIS — D46.Z MDS (MYELODYSPLASTIC SYNDROME), HIGH GRADE (HCC): Primary | ICD-10-CM

## 2021-10-25 NOTE — H&P (VIEW-ONLY)
Serina Winn is a 63year old female. No chief complaint on file.     HPI:    The patient presents for port placement. The reason for the port is poor vascular access.   Patient with myelodysplastic syndrome worsening requiring stronger chemothe Procedure: COLONOSCOPY;  Surgeon: Mike Garza MD;  Location: [facility] ENDOSCOPY   • COLONOSCOPY       • CYST ASPIRATION LEFT   2010   • TONSILLECTOMY         age 5   • UPPER GI ENDOSCOPY,EXAM                 Family History   Problem Relation Age of Onset       ASSESSMENT/ PLAN:   This is an 63year old female who needs a port insertion for myelodysplastic syndrome worsening requiring stronger chemotherapy. I had a length discussion with the patient as to the need for port placement.   Discussed the new ra

## 2021-10-26 ENCOUNTER — HOSPITAL ENCOUNTER (OUTPATIENT)
Dept: CT IMAGING | Facility: HOSPITAL | Age: 66
Discharge: HOME | End: 2021-10-26
Attending: INTERNAL MEDICINE
Payer: COMMERCIAL

## 2021-10-26 VITALS
RESPIRATION RATE: 16 BRPM | DIASTOLIC BLOOD PRESSURE: 63 MMHG | HEART RATE: 78 BPM | SYSTOLIC BLOOD PRESSURE: 116 MMHG | WEIGHT: 150 LBS | OXYGEN SATURATION: 99 % | HEIGHT: 65 IN | BODY MASS INDEX: 24.99 KG/M2

## 2021-10-26 DIAGNOSIS — D46.Z MDS (MYELODYSPLASTIC SYNDROME), HIGH GRADE (HCC): ICD-10-CM

## 2021-10-26 PROCEDURE — 99152 MOD SED SAME PHYS/QHP 5/>YRS: CPT | Performed by: INTERNAL MEDICINE

## 2021-10-26 PROCEDURE — 88185 FLOWCYTOMETRY/TC ADD-ON: CPT | Performed by: RADIOLOGY

## 2021-10-26 PROCEDURE — 38221 DX BONE MARROW BIOPSIES: CPT | Performed by: INTERNAL MEDICINE

## 2021-10-26 PROCEDURE — 88360 TUMOR IMMUNOHISTOCHEM/MANUAL: CPT | Performed by: INTERNAL MEDICINE

## 2021-10-26 PROCEDURE — 88189 FLOWCYTOMETRY/READ 16 & >: CPT | Performed by: RADIOLOGY

## 2021-10-26 PROCEDURE — 88341 IMHCHEM/IMCYTCHM EA ADD ANTB: CPT | Performed by: INTERNAL MEDICINE

## 2021-10-26 PROCEDURE — 88237 TISSUE CULTURE BONE MARROW: CPT | Performed by: RADIOLOGY

## 2021-10-26 PROCEDURE — 88184 FLOWCYTOMETRY/ TC 1 MARKER: CPT | Performed by: RADIOLOGY

## 2021-10-26 PROCEDURE — 85097 BONE MARROW INTERPRETATION: CPT | Performed by: INTERNAL MEDICINE

## 2021-10-26 PROCEDURE — 88305 TISSUE EXAM BY PATHOLOGIST: CPT | Performed by: INTERNAL MEDICINE

## 2021-10-26 PROCEDURE — 88264 CHROMOSOME ANALYSIS 20-25: CPT | Performed by: RADIOLOGY

## 2021-10-26 PROCEDURE — 88275 CYTOGENETICS 100-300: CPT | Performed by: RADIOLOGY

## 2021-10-26 PROCEDURE — 88313 SPECIAL STAINS GROUP 2: CPT | Performed by: INTERNAL MEDICINE

## 2021-10-26 PROCEDURE — 88311 DECALCIFY TISSUE: CPT | Performed by: INTERNAL MEDICINE

## 2021-10-26 PROCEDURE — 88271 CYTOGENETICS DNA PROBE: CPT | Performed by: RADIOLOGY

## 2021-10-26 PROCEDURE — 88291 CYTO/MOLECULAR REPORT: CPT | Performed by: RADIOLOGY

## 2021-10-26 PROCEDURE — 88342 IMHCHEM/IMCYTCHM 1ST ANTB: CPT | Performed by: INTERNAL MEDICINE

## 2021-10-26 PROCEDURE — 77012 CT SCAN FOR NEEDLE BIOPSY: CPT | Performed by: INTERNAL MEDICINE

## 2021-10-26 RX ORDER — MIDAZOLAM HYDROCHLORIDE 1 MG/ML
INJECTION INTRAMUSCULAR; INTRAVENOUS
Status: DISPENSED
Start: 2021-10-26 | End: 2021-10-26

## 2021-10-26 RX ORDER — SODIUM CHLORIDE 9 MG/ML
INJECTION, SOLUTION INTRAVENOUS CONTINUOUS
Status: DISCONTINUED | OUTPATIENT
Start: 2021-10-26 | End: 2021-10-28

## 2021-10-26 RX ORDER — MIDAZOLAM HYDROCHLORIDE 1 MG/ML
1 INJECTION INTRAMUSCULAR; INTRAVENOUS EVERY 5 MIN PRN
Status: DISCONTINUED | OUTPATIENT
Start: 2021-10-26 | End: 2021-10-28

## 2021-10-26 RX ORDER — MIDAZOLAM HYDROCHLORIDE 1 MG/ML
INJECTION INTRAMUSCULAR; INTRAVENOUS
Status: COMPLETED
Start: 2021-10-26 | End: 2021-10-26

## 2021-10-26 RX ORDER — FLUMAZENIL 0.1 MG/ML
0.2 INJECTION, SOLUTION INTRAVENOUS AS NEEDED
Status: DISCONTINUED | OUTPATIENT
Start: 2021-10-26 | End: 2021-10-28

## 2021-10-26 RX ORDER — NALOXONE HYDROCHLORIDE 0.4 MG/ML
80 INJECTION, SOLUTION INTRAMUSCULAR; INTRAVENOUS; SUBCUTANEOUS AS NEEDED
Status: DISCONTINUED | OUTPATIENT
Start: 2021-10-26 | End: 2021-10-28

## 2021-10-26 RX ORDER — HEPARIN SODIUM 1000 [USP'U]/ML
INJECTION, SOLUTION INTRAVENOUS; SUBCUTANEOUS
Status: DISPENSED
Start: 2021-10-26 | End: 2021-10-26

## 2021-10-26 RX ADMIN — MIDAZOLAM HYDROCHLORIDE 1 MG: 1 INJECTION INTRAMUSCULAR; INTRAVENOUS at 11:33:00

## 2021-10-26 RX ADMIN — MIDAZOLAM HYDROCHLORIDE 2 MG: 1 INJECTION INTRAMUSCULAR; INTRAVENOUS at 11:30:00

## 2021-10-26 NOTE — IMAGING NOTE
[address]40 [facility] BIOPSY, SITE CLEAN AND DRY TO RIGHT UPPER ILIAC CREST.  DAUGHTER WITH PT. PT MONITORED POST PROCEDURE SEE FLOW SHEET FOR VS. JUICE AND COOKIES GIVEN PO.     0785 PT IS COMFORTABLE DISCHARGE INSTRUCTIONS GIVEN VERBALLY AND WRI

## 2021-10-26 NOTE — INTERVAL H&P NOTE
The above referenced H&P was reviewed by Alyssia Chaudhari MD on 10/26/2021, the patient was examined and no significant changes have occurred in the patient's condition since the H&P was performed.   Risks, benefits, alternative treatments and consequences of n

## 2021-11-26 PROBLEM — Z45.2 ENCOUNTER FOR CARE RELATED TO VASCULAR ACCESS PORT: Status: ACTIVE | Noted: 2021-11-26

## 2022-01-05 ENCOUNTER — HOSPITAL ENCOUNTER (INPATIENT)
Facility: HOSPITAL | Age: 67
LOS: 2 days | Discharge: HOME | DRG: 812 | End: 2022-01-07
Attending: EMERGENCY MEDICINE | Admitting: STUDENT IN AN ORGANIZED HEALTH CARE EDUCATION/TRAINING PROGRAM
Payer: COMMERCIAL

## 2022-01-05 DIAGNOSIS — D61.818 PANCYTOPENIA (HCC): ICD-10-CM

## 2022-01-05 DIAGNOSIS — D69.6 THROMBOCYTOPENIA (HCC): Primary | ICD-10-CM

## 2022-01-05 DIAGNOSIS — D46.Z MDS (MYELODYSPLASTIC SYNDROME), HIGH GRADE (HCC): ICD-10-CM

## 2022-01-05 LAB
ANION GAP SERPL CALC-SCNC: 7 MMOL/L (ref 0–18)
ANTIBODY SCREEN: NEGATIVE
BASOPHILS # BLD AUTO: 0 X10(3) UL (ref 0–0.2)
BASOPHILS NFR BLD AUTO: 0 %
BUN BLD-MCNC: 13 MG/DL (ref 7–18)
BUN/CREAT SERPL: 19.4 (ref 10–20)
CALCIUM BLD-MCNC: 9 MG/DL (ref 8.5–10.1)
CHLORIDE SERPL-SCNC: 112 MMOL/L (ref 98–112)
CO2 SERPL-SCNC: 23 MMOL/L (ref 21–32)
CREAT BLD-MCNC: 0.67 MG/DL
DEPRECATED RDW RBC AUTO: 74.3 FL (ref 35.1–46.3)
EOSINOPHIL # BLD AUTO: 0 X10(3) UL (ref 0–0.7)
EOSINOPHIL NFR BLD AUTO: 0 %
ERYTHROCYTE [DISTWIDTH] IN BLOOD BY AUTOMATED COUNT: 24.9 % (ref 11–15)
GLUCOSE BLD-MCNC: 81 MG/DL (ref 70–99)
HCT VFR BLD AUTO: 24.7 %
HGB BLD-MCNC: 6.9 G/DL
IMM GRANULOCYTES # BLD AUTO: 0.02 X10(3) UL (ref 0–1)
IMM GRANULOCYTES NFR BLD: 0.9 %
LYMPHOCYTES # BLD AUTO: 1.96 X10(3) UL (ref 1–4)
LYMPHOCYTES NFR BLD AUTO: 85.6 %
MCH RBC QN AUTO: 24.7 PG (ref 26–34)
MCHC RBC AUTO-ENTMCNC: 27.9 G/DL (ref 31–37)
MCV RBC AUTO: 88.5 FL
MONOCYTES # BLD AUTO: 0.04 X10(3) UL (ref 0.1–1)
MONOCYTES NFR BLD AUTO: 1.7 %
NEUTROPHILS # BLD AUTO: 0.27 X10 (3) UL (ref 1.5–7.7)
NEUTROPHILS # BLD AUTO: 0.27 X10(3) UL (ref 1.5–7.7)
NEUTROPHILS NFR BLD AUTO: 11.8 %
OSMOLALITY SERPL CALC.SUM OF ELEC: 293 MOSM/KG (ref 275–295)
PLATELET # BLD AUTO: 10 10(3)UL (ref 150–450)
PLATELET MORPHOLOGY: NORMAL
POTASSIUM SERPL-SCNC: 3.6 MMOL/L (ref 3.5–5.1)
RBC # BLD AUTO: 2.79 X10(6)UL
RH BLOOD TYPE: POSITIVE
SARS-COV-2 RNA RESP QL NAA+PROBE: NOT DETECTED
SODIUM SERPL-SCNC: 142 MMOL/L (ref 136–145)
WBC # BLD AUTO: 2.3 X10(3) UL (ref 4–11)

## 2022-01-05 PROCEDURE — 99285 EMERGENCY DEPT VISIT HI MDM: CPT

## 2022-01-05 PROCEDURE — 30233R1 TRANSFUSION OF NONAUTOLOGOUS PLATELETS INTO PERIPHERAL VEIN, PERCUTANEOUS APPROACH: ICD-10-PCS | Performed by: STUDENT IN AN ORGANIZED HEALTH CARE EDUCATION/TRAINING PROGRAM

## 2022-01-05 PROCEDURE — 86901 BLOOD TYPING SEROLOGIC RH(D): CPT | Performed by: EMERGENCY MEDICINE

## 2022-01-05 PROCEDURE — 36430 TRANSFUSION BLD/BLD COMPNT: CPT

## 2022-01-05 PROCEDURE — 86900 BLOOD TYPING SEROLOGIC ABO: CPT | Performed by: EMERGENCY MEDICINE

## 2022-01-05 PROCEDURE — 85025 COMPLETE CBC W/AUTO DIFF WBC: CPT | Performed by: EMERGENCY MEDICINE

## 2022-01-05 PROCEDURE — 96365 THER/PROPH/DIAG IV INF INIT: CPT

## 2022-01-05 PROCEDURE — 96366 THER/PROPH/DIAG IV INF ADDON: CPT

## 2022-01-05 PROCEDURE — 85060 BLOOD SMEAR INTERPRETATION: CPT | Performed by: EMERGENCY MEDICINE

## 2022-01-05 PROCEDURE — 86920 COMPATIBILITY TEST SPIN: CPT

## 2022-01-05 PROCEDURE — 80048 BASIC METABOLIC PNL TOTAL CA: CPT | Performed by: EMERGENCY MEDICINE

## 2022-01-05 PROCEDURE — 86850 RBC ANTIBODY SCREEN: CPT | Performed by: EMERGENCY MEDICINE

## 2022-01-05 RX ORDER — SODIUM CHLORIDE 9 MG/ML
INJECTION, SOLUTION INTRAVENOUS ONCE
Status: COMPLETED | OUTPATIENT
Start: 2022-01-05 | End: 2022-01-05

## 2022-01-05 RX ORDER — BISACODYL 10 MG
10 SUPPOSITORY, RECTAL RECTAL
Status: DISCONTINUED | OUTPATIENT
Start: 2022-01-05 | End: 2022-01-07

## 2022-01-05 RX ORDER — ATORVASTATIN CALCIUM 40 MG/1
40 TABLET, FILM COATED ORAL NIGHTLY
Status: DISCONTINUED | OUTPATIENT
Start: 2022-01-05 | End: 2022-01-07

## 2022-01-05 RX ORDER — ONDANSETRON 2 MG/ML
4 INJECTION INTRAMUSCULAR; INTRAVENOUS EVERY 6 HOURS PRN
Status: DISCONTINUED | OUTPATIENT
Start: 2022-01-05 | End: 2022-01-07

## 2022-01-05 RX ORDER — SENNOSIDES 8.6 MG
17.2 TABLET ORAL NIGHTLY PRN
Status: DISCONTINUED | OUTPATIENT
Start: 2022-01-05 | End: 2022-01-07

## 2022-01-05 RX ORDER — POLYETHYLENE GLYCOL 3350 17 G/17G
17 POWDER, FOR SOLUTION ORAL DAILY PRN
Status: DISCONTINUED | OUTPATIENT
Start: 2022-01-05 | End: 2022-01-07

## 2022-01-05 RX ORDER — SODIUM PHOSPHATE, DIBASIC AND SODIUM PHOSPHATE, MONOBASIC 7; 19 G/133ML; G/133ML
1 ENEMA RECTAL ONCE AS NEEDED
Status: DISCONTINUED | OUTPATIENT
Start: 2022-01-05 | End: 2022-01-07

## 2022-01-05 RX ORDER — ACETAMINOPHEN 325 MG/1
650 TABLET ORAL EVERY 6 HOURS PRN
Status: DISCONTINUED | OUTPATIENT
Start: 2022-01-05 | End: 2022-01-07

## 2022-01-05 RX ORDER — MELATONIN
325 NIGHTLY
Status: DISCONTINUED | OUTPATIENT
Start: 2022-01-05 | End: 2022-01-07

## 2022-01-05 RX ORDER — PROCHLORPERAZINE EDISYLATE 5 MG/ML
5 INJECTION INTRAMUSCULAR; INTRAVENOUS EVERY 8 HOURS PRN
Status: DISCONTINUED | OUTPATIENT
Start: 2022-01-05 | End: 2022-01-07

## 2022-01-05 NOTE — ED PROVIDER NOTES
Patient Seen in: [facility] Emergency Department    History   Patient presents with:  Abnormal Labs    Stated Complaint: platelet transfusion    HPI    Patient presents to the emergency department for admission platelet transfusion and IVIG.   She standard drinks      Comment: Very rare wine    Drug use: No        Medications :   MEDROXYPROGESTERONE ACETATE 2.5 MG Oral Tab,  TAKE 1 TABLET DAILY   ESTRADIOL 1 MG Oral Tab,  TAKE ONE AND ONE-HALF TABLETS DAILY   lidocaine-prilocaine 2.5-2.5 % External auscultation bilaterally with good effort. No wheezes, ronchi, or rales. Abdomen:  Soft, non-tender, non-distended. No masses, no hepato-splenomegaly. Musculoskeletal:  Good muscle tone. Skin:  Warm, dry, well perfused. Good skin turgor.   No rashes DIFFERENTIAL[[id number]]          Abnormal            Final result                 Please view results for these tests on the individual orders. SCAN SLIDE   MD BLOOD SMEAR CONSULT   TYPE AND SCREEN    Narrative:      The following orders were created for

## 2022-01-05 NOTE — ED INITIAL ASSESSMENT (HPI)
Pt sent here for IVIG, platelets and admission per pt/notes, pt in current treatment for myleodysplastic syndrome

## 2022-01-05 NOTE — H&P
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 Radiology: No results found.       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

## 2022-01-06 ENCOUNTER — APPOINTMENT (OUTPATIENT)
Dept: CT IMAGING | Facility: HOSPITAL | Age: 67
DRG: 812 | End: 2022-01-06
Attending: CLINICAL NURSE SPECIALIST
Payer: COMMERCIAL

## 2022-01-06 LAB
ANION GAP SERPL CALC-SCNC: 7 MMOL/L (ref 0–18)
BASOPHILS # BLD AUTO: 0 X10(3) UL (ref 0–0.2)
BASOPHILS NFR BLD AUTO: 0 %
BUN BLD-MCNC: 14 MG/DL (ref 7–18)
BUN/CREAT SERPL: 20 (ref 10–20)
CALCIUM BLD-MCNC: 8.6 MG/DL (ref 8.5–10.1)
CHLORIDE SERPL-SCNC: 112 MMOL/L (ref 98–112)
CO2 SERPL-SCNC: 24 MMOL/L (ref 21–32)
CREAT BLD-MCNC: 0.7 MG/DL
DEPRECATED RDW RBC AUTO: 70.7 FL (ref 35.1–46.3)
EOSINOPHIL # BLD AUTO: 0 X10(3) UL (ref 0–0.7)
EOSINOPHIL NFR BLD AUTO: 0 %
ERYTHROCYTE [DISTWIDTH] IN BLOOD BY AUTOMATED COUNT: 24.8 % (ref 11–15)
GLUCOSE BLD-MCNC: 95 MG/DL (ref 70–99)
HCT VFR BLD AUTO: 23.8 %
HGB BLD-MCNC: 7.3 G/DL
IMM GRANULOCYTES # BLD AUTO: 0.04 X10(3) UL (ref 0–1)
IMM GRANULOCYTES NFR BLD: 4 %
INR BLD: 1.1 (ref 0.8–1.2)
LYMPHOCYTES # BLD AUTO: 0.67 X10(3) UL (ref 1–4)
LYMPHOCYTES NFR BLD AUTO: 67 %
MCH RBC QN AUTO: 27.8 PG (ref 26–34)
MCHC RBC AUTO-ENTMCNC: 30.7 G/DL (ref 31–37)
MCV RBC AUTO: 90.5 FL
MONOCYTES # BLD AUTO: 0.03 X10(3) UL (ref 0.1–1)
MONOCYTES NFR BLD AUTO: 3 %
NEUTROPHILS # BLD AUTO: 0.26 X10 (3) UL (ref 1.5–7.7)
NEUTROPHILS # BLD AUTO: 0.26 X10(3) UL (ref 1.5–7.7)
NEUTROPHILS NFR BLD AUTO: 26 %
OSMOLALITY SERPL CALC.SUM OF ELEC: 296 MOSM/KG (ref 275–295)
PLATELET # BLD AUTO: 77 10(3)UL (ref 150–450)
POTASSIUM SERPL-SCNC: 3.2 MMOL/L (ref 3.5–5.1)
PROTHROMBIN TIME: 14.3 SECONDS (ref 11.6–14.8)
RBC # BLD AUTO: 2.63 X10(6)UL
SODIUM SERPL-SCNC: 143 MMOL/L (ref 136–145)
WBC # BLD AUTO: 1 X10(3) UL (ref 4–11)

## 2022-01-06 PROCEDURE — 77012 CT SCAN FOR NEEDLE BIOPSY: CPT | Performed by: CLINICAL NURSE SPECIALIST

## 2022-01-06 PROCEDURE — 99152 MOD SED SAME PHYS/QHP 5/>YRS: CPT | Performed by: CLINICAL NURSE SPECIALIST

## 2022-01-06 PROCEDURE — 36430 TRANSFUSION BLD/BLD COMPNT: CPT

## 2022-01-06 PROCEDURE — 88360 TUMOR IMMUNOHISTOCHEM/MANUAL: CPT | Performed by: RADIOLOGY

## 2022-01-06 PROCEDURE — 30233N1 TRANSFUSION OF NONAUTOLOGOUS RED BLOOD CELLS INTO PERIPHERAL VEIN, PERCUTANEOUS APPROACH: ICD-10-PCS | Performed by: STUDENT IN AN ORGANIZED HEALTH CARE EDUCATION/TRAINING PROGRAM

## 2022-01-06 PROCEDURE — 88305 TISSUE EXAM BY PATHOLOGIST: CPT | Performed by: RADIOLOGY

## 2022-01-06 PROCEDURE — 85610 PROTHROMBIN TIME: CPT | Performed by: STUDENT IN AN ORGANIZED HEALTH CARE EDUCATION/TRAINING PROGRAM

## 2022-01-06 PROCEDURE — 80048 BASIC METABOLIC PNL TOTAL CA: CPT | Performed by: STUDENT IN AN ORGANIZED HEALTH CARE EDUCATION/TRAINING PROGRAM

## 2022-01-06 PROCEDURE — 88313 SPECIAL STAINS GROUP 2: CPT | Performed by: RADIOLOGY

## 2022-01-06 PROCEDURE — 88341 IMHCHEM/IMCYTCHM EA ADD ANTB: CPT | Performed by: RADIOLOGY

## 2022-01-06 PROCEDURE — 88189 FLOWCYTOMETRY/READ 16 & >: CPT | Performed by: INTERNAL MEDICINE

## 2022-01-06 PROCEDURE — 88237 TISSUE CULTURE BONE MARROW: CPT | Performed by: INTERNAL MEDICINE

## 2022-01-06 PROCEDURE — 85097 BONE MARROW INTERPRETATION: CPT | Performed by: RADIOLOGY

## 2022-01-06 PROCEDURE — 88291 CYTO/MOLECULAR REPORT: CPT | Performed by: INTERNAL MEDICINE

## 2022-01-06 PROCEDURE — 88184 FLOWCYTOMETRY/ TC 1 MARKER: CPT | Performed by: INTERNAL MEDICINE

## 2022-01-06 PROCEDURE — 88271 CYTOGENETICS DNA PROBE: CPT | Performed by: INTERNAL MEDICINE

## 2022-01-06 PROCEDURE — 88264 CHROMOSOME ANALYSIS 20-25: CPT | Performed by: INTERNAL MEDICINE

## 2022-01-06 PROCEDURE — 88342 IMHCHEM/IMCYTCHM 1ST ANTB: CPT | Performed by: RADIOLOGY

## 2022-01-06 PROCEDURE — 88185 FLOWCYTOMETRY/TC ADD-ON: CPT | Performed by: INTERNAL MEDICINE

## 2022-01-06 PROCEDURE — 38221 DX BONE MARROW BIOPSIES: CPT | Performed by: CLINICAL NURSE SPECIALIST

## 2022-01-06 PROCEDURE — 07DR3ZX EXTRACTION OF ILIAC BONE MARROW, PERCUTANEOUS APPROACH, DIAGNOSTIC: ICD-10-PCS | Performed by: RADIOLOGY

## 2022-01-06 PROCEDURE — 88275 CYTOGENETICS 100-300: CPT | Performed by: INTERNAL MEDICINE

## 2022-01-06 PROCEDURE — 85025 COMPLETE CBC W/AUTO DIFF WBC: CPT | Performed by: STUDENT IN AN ORGANIZED HEALTH CARE EDUCATION/TRAINING PROGRAM

## 2022-01-06 PROCEDURE — 88311 DECALCIFY TISSUE: CPT | Performed by: RADIOLOGY

## 2022-01-06 RX ORDER — NALOXONE HYDROCHLORIDE 0.4 MG/ML
80 INJECTION, SOLUTION INTRAMUSCULAR; INTRAVENOUS; SUBCUTANEOUS AS NEEDED
Status: DISCONTINUED | OUTPATIENT
Start: 2022-01-06 | End: 2022-01-06

## 2022-01-06 RX ORDER — MIDAZOLAM HYDROCHLORIDE 1 MG/ML
1 INJECTION INTRAMUSCULAR; INTRAVENOUS EVERY 5 MIN PRN
Status: DISCONTINUED | OUTPATIENT
Start: 2022-01-06 | End: 2022-01-06

## 2022-01-06 RX ORDER — FLUMAZENIL 0.1 MG/ML
0.2 INJECTION, SOLUTION INTRAVENOUS AS NEEDED
Status: DISCONTINUED | OUTPATIENT
Start: 2022-01-06 | End: 2022-01-06

## 2022-01-06 RX ORDER — HEPARIN SODIUM 1000 [USP'U]/ML
INJECTION, SOLUTION INTRAVENOUS; SUBCUTANEOUS
Status: DISPENSED
Start: 2022-01-06 | End: 2022-01-06

## 2022-01-06 RX ORDER — MIDAZOLAM HYDROCHLORIDE 1 MG/ML
INJECTION INTRAMUSCULAR; INTRAVENOUS
Status: COMPLETED
Start: 2022-01-06 | End: 2022-01-06

## 2022-01-06 RX ORDER — POTASSIUM CHLORIDE 20 MEQ/1
40 TABLET, EXTENDED RELEASE ORAL ONCE
Status: COMPLETED | OUTPATIENT
Start: 2022-01-06 | End: 2022-01-06

## 2022-01-06 RX ORDER — SODIUM CHLORIDE 9 MG/ML
INJECTION, SOLUTION INTRAVENOUS CONTINUOUS
Status: DISCONTINUED | OUTPATIENT
Start: 2022-01-06 | End: 2022-01-07

## 2022-01-06 NOTE — PLAN OF CARE
Problem: Patient Centered Care  Goal: Patient preferences are identified and integrated in the patient's plan of care  Description: Interventions:  - What would you like us to know as we care for you?   - Provide timely, complete, and accurate informatio medications as ordered and appropriate  - Administer supportive blood products/factors as ordered and appropriate  Outcome: Progressing  Goal: Free from bleeding injury  Description: (Example usage: patient with low platelets)  INTERVENTIONS:  - Avoid intr

## 2022-01-06 NOTE — CONSULTS
Hematology/Oncology Consult Note        NAME: Stella Shaw - ROOM: [facility]/5- - MRN: [id number] - Age: 61year old - : 1955    Reason for Consult:  MDS,thrombocytopenia    Patient is a 61 y.o female well known to me for the management ofr nicotine  1 patch Transdermal Daily      sodium chloride       ALLERGIES: No Known Allergies    Review of Systems   12 point review of systems was conducted and otherwise negative than HPI      Physical Exam:  /56 (BP Location: Right arm)   Pulse 83

## 2022-01-06 NOTE — PROGRESS NOTES
[facility] Hospitalist Progress Note     CC: Hospital Follow up    PCP: Alex Brand MD       Assessment/Plan:     Principal Problem:     Thrombocytopenia ([facility] [address]  Active Problems:    MDS (myelodysplastic syndrome), high grade (HCC)    Pancytopenia (HCC) (68.1 kg), last menstrual period 01/01/2008, SpO2 93 %, not currently breastfeeding.     Temp:  [98.4 °F (36.9 °C)-100 °F (37.8 °C)] 99.5 °F (37.5 °C)  Pulse:  [72-98] 83  Resp:  [15-22] 16  BP: ()/(50-71) 113/56      Intake/Output:    Intake/Output S chloride       acetaminophen, polyethylene glycol (PEG 3350), sennosides, bisacodyl, fleet enema, ondansetron, prochlorperazine

## 2022-01-07 VITALS
RESPIRATION RATE: 18 BRPM | OXYGEN SATURATION: 97 % | DIASTOLIC BLOOD PRESSURE: 62 MMHG | TEMPERATURE: 99 F | BODY MASS INDEX: 25.02 KG/M2 | WEIGHT: 150.19 LBS | SYSTOLIC BLOOD PRESSURE: 116 MMHG | HEIGHT: 65 IN | HEART RATE: 67 BPM

## 2022-01-07 PROBLEM — D69.3 IMMUNE THROMBOCYTOPENIA (HCC): Status: ACTIVE | Noted: 2022-01-07

## 2022-01-07 LAB
BASOPHILS # BLD AUTO: 0 X10(3) UL (ref 0–0.2)
BASOPHILS NFR BLD AUTO: 0 %
BLOOD TYPE BARCODE: 6200
BLOOD TYPE BARCODE: 6200
DEPRECATED RDW RBC AUTO: 74.7 FL (ref 35.1–46.3)
EOSINOPHIL # BLD AUTO: 0.01 X10(3) UL (ref 0–0.7)
EOSINOPHIL NFR BLD AUTO: 0.7 %
ERYTHROCYTE [DISTWIDTH] IN BLOOD BY AUTOMATED COUNT: 25.7 % (ref 11–15)
HCT VFR BLD AUTO: 24.5 %
HGB BLD-MCNC: 7.3 G/DL
IMM GRANULOCYTES # BLD AUTO: 0.02 X10(3) UL (ref 0–1)
IMM GRANULOCYTES NFR BLD: 1.4 %
LYMPHOCYTES # BLD AUTO: 1.23 X10(3) UL (ref 1–4)
LYMPHOCYTES NFR BLD AUTO: 84.8 %
MCH RBC QN AUTO: 27.1 PG (ref 26–34)
MCHC RBC AUTO-ENTMCNC: 29.8 G/DL (ref 31–37)
MCV RBC AUTO: 91.1 FL
MONOCYTES # BLD AUTO: 0.03 X10(3) UL (ref 0.1–1)
MONOCYTES NFR BLD AUTO: 2.1 %
NEUTROPHILS # BLD AUTO: 0.16 X10 (3) UL (ref 1.5–7.7)
NEUTROPHILS # BLD AUTO: 0.16 X10(3) UL (ref 1.5–7.7)
NEUTROPHILS NFR BLD AUTO: 11 %
PLATELET # BLD AUTO: 46 10(3)UL (ref 150–450)
POTASSIUM SERPL-SCNC: 3.9 MMOL/L (ref 3.5–5.1)
RBC # BLD AUTO: 2.69 X10(6)UL
WBC # BLD AUTO: 1.5 X10(3) UL (ref 4–11)

## 2022-01-07 PROCEDURE — 36592 COLLECT BLOOD FROM PICC: CPT

## 2022-01-07 PROCEDURE — 85025 COMPLETE CBC W/AUTO DIFF WBC: CPT | Performed by: STUDENT IN AN ORGANIZED HEALTH CARE EDUCATION/TRAINING PROGRAM

## 2022-01-07 PROCEDURE — 84132 ASSAY OF SERUM POTASSIUM: CPT | Performed by: STUDENT IN AN ORGANIZED HEALTH CARE EDUCATION/TRAINING PROGRAM

## 2022-01-07 RX ORDER — DIPHENHYDRAMINE HCL 50 MG
50 CAPSULE ORAL ONCE
OUTPATIENT
Start: 2022-02-01 | End: 2022-02-01

## 2022-01-07 RX ORDER — ACETAMINOPHEN 500 MG
1000 TABLET ORAL ONCE
OUTPATIENT
Start: 2022-02-01 | End: 2022-02-01

## 2022-01-07 NOTE — PROGRESS NOTES
Hematology/Oncology follow up note        NAME: Lubna Nguyen D [address]: 834/531-V - MRN: [id number] - Age: 61year old - : 1955    Subjective:  plts 41 today  No evidence of bleeding     Past Medical History:   Diagnosis Date   • Abnormal fa Wt 68.1 kg (150 lb 3.2 oz)   LMP 01/01/2008 (Approximate)   SpO2 97%   BMI 24.99 kg/m²   Physical Exam:   General: alert, cooperative, no respiratory distress. Head: Normocephalic, without obvious abnormality, atraumatic.    Lungs: breathing comfortably

## 2022-01-07 NOTE — PAYOR COMM NOTE
Discharge Notification    Patient Name: Sharif Hager  Payor: Noel KAPADIA/MARCELLUS  Subscriber #: [id number]  Authorization Number: [id number]  Admit Date/Time: 1/5/2022 1:45 PM  Discharge Date/Time: 1/7/2022 11:55 AM

## 2022-01-07 NOTE — PLAN OF CARE
Problem: Patient Centered Care  Goal: Patient preferences are identified and integrated in the patient's plan of care  Description: Interventions:  - What would you like us to know as we care for you?   - Provide timely, complete, and accurate informatio products/factors as ordered and appropriate  Outcome: Progressing  Goal: Free from bleeding injury  Description: (Example usage: patient with low platelets)  INTERVENTIONS:  - Avoid intramuscular injections, enemas and rectal medication administration  - E

## 2022-01-07 NOTE — PLAN OF CARE
Patient alert and oriented. Denies pain, denies nausea, tolerating diet. Port. Voiding. Up ad nadiya. Platelets 46, anc 7.67, MD notified. Discharging to home. Call light within reach, using appropriately.      Problem: SAFETY ADULT - FALL  Goal: Free from fal intact  Description: INTERVENTIONS  - Assess and document risk factors for pressure ulcer development  - Assess and document skin integrity  - Monitor for areas of redness and/or skin breakdown  - Initiate interventions, skin care algorithm/standards of ca

## 2022-01-07 NOTE — PLAN OF CARE
Plan of care reviewed with Gosia Bautista. Patient went for bone marrow biopsy this am. Dressing in place. Denies pain. Re-started on general diet and tolerating well. Ambulating independently in room. Safety measures in place and call light within reach.

## 2022-01-07 NOTE — PAYOR COMM NOTE
--------------  CONTINUED STAY REVIEW    Payor: Hitesh KAPADIA/MARCELLUS  Subscriber #:  [id number]  Authorization Number: [id number]    Admit date: 1/5/22  Admit time:  6:16 PM    Admitting Physician: Edin Gross DO  Attending Physician:  Edin Gross 120/52 95 % — — — RC    01/06/22 0030 99.1 °F (37.3 °C) 85 16 106/53 94 % — — —         CIWA Scores (since admission)     None        Blood Transfusion Record     Product Unit Status Volume Start End            Transfuse RBC       21  151300  V-E033 ferrous sulfate  325 mg Oral Nightly   • nicotine  1 patch Transdermal Daily      • sodium chloride        ALLERGIES: No Known Allergies     Review of Systems   12 point review of systems was conducted and otherwise negative than HPI        Physical Exam: this patient, please call with any questions.  Clovis Sinclair M.D.  [facility] Hematology and Oncology                Electronically signed by Audelia Christian MD at 1/7/2022 10:18 AM    PLEASE [address] AND NEXT REVIEW DATE [phone]

## 2022-01-07 NOTE — PAYOR COMM NOTE
--------------  ADMISSION REVIEW     Payor: Sean PINTO  Subscriber #:  [id number]  Authorization Number: [id number]    Admit date: 1/5/22  Admit time:  6:16 PM       REVIEW DOCUMENTATION:     ED Provider Notes      ED Provider Notes signed by Missy Higginbotham, [address] [facility] ENDOSCOPY   • COLONOSCOPY     • CYST ASPIRATION LEFT  2010   • TONSILLECTOMY      age 5   • UPPER GI ENDOSCOPY,EXAM         Social History    Tobacco Use            Smokeless tobacco: Never Used          Vaping Use      Vaping Use: Never used    Alcoho Exam  Constitutional:  Alert, well nourished adult lying in bed in no distress. Vital signs noted. Eye:  No scleral icterus. Eyelids appear normal, no lesions. Cardiovascular:  Normal S1 and S2, no murmur, regular, with good peripheral perfusion.   Resp following orders were created for panel order CBC With Differential With Platelet.   Procedure                               Abnormality         Status                     ---------                               -----------         ------ Type: Physician    Filed: 1/5/2022  6:59 PM Date of Service: 1/5/2022  5:06 PM Status: Signed    : Tanya Quesada DO (Physician)           [facility] Hospitalist H&P       CC: Patient presents with:  Abnormal Labs       PCP: Gaye Carmona MD    Hist Encounter).         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      Severe thrombocytopenia  Neutropenia  Anemia  M Eddie Paris, RN      potassium chloride (K-DUR M20) CR tab 40 mEq     Date Action Dose Route User    1/6/2022 2019 Given 40 mEq Oral Tova Leiva RN          Vitals (last day)     Date/Time Temp Pulse Resp BP SpO2 Weight O2 Device O2 Flow Rate (L/min) W of 77  - c/pw bone marrow biopsy this am   - recheck plts tomorrow  - okay to go home if plts remain > 50      High grade MDS  - on decitabine  - awaiting transplant  - no plans for treatment as inpatient     Plan    - follow up with oncology for next cycl <<----- Click to add NO pertinent Past Medical History No pertinent past medical history

## 2022-01-12 LAB
CD10 CELLS NFR SPEC: <1 %
CD10 CELLS NFR SPEC: <1 %
CD11C CELLS NFR SPEC: 2 %
CD11C CELLS NFR SPEC: 2 %
CD14 CELLS NFR SPEC: 1 %
CD14 CELLS NFR SPEC: 1 %
CD19 CELLS NFR SPEC: 12 %
CD19 CELLS NFR SPEC: 12 %
CD19/CD10 CELLS: <1 %
CD19/CD10 CELLS: <1 %
CD20 CELLS NFR SPEC: 14 %
CD20 CELLS NFR SPEC: 14 %
CD22 CELLS NFR SPEC: 11 %
CD23 CELLS NFR SPEC: 4 %
CD3 CELLS NFR SPEC: 88 %
CD3 CELLS NFR SPEC: 88 %
CD3+CD4+ CELLS NFR SPEC: 54 %
CD3+CD4+ CELLS/CD3+CD8+ CLL SPEC: 1.6
CD3+CD8+ CELLS NFR SPEC: 33 %
CD33 CELLS NFR SPEC: <1 %
CD33/CD34 CELLS: 10 %
CD34 CELLS NFR SPEC: 8 %
CD45 CELLS NFR SPEC: 100 %
CD45 CELLS NFR SPEC: 100 %
CD5 CELLS NFR SPEC: 87 %
CD5 CELLS NFR SPEC: 87 %
CD5/CD19 CELLS: <1 %
CD56 CELLS NFR SPEC: <1 %
CD7 CELLS NFR SPEC: 59 %
CD7 CELLS NFR SPEC: 59 %
CELL SURF KAPPA/LAMBDA RATIO: 1.8
CELL SURF KAPPA/LAMBDA RATIO: 1.8
CELL SURF LAMBDA LIGHT CHAIN: 4 %
CELL SURF LAMBDA LIGHT CHAIN: 4 %
CELL SURFACE KAPPA LIGHT CHAIN: 7 %
CELL SURFACE KAPPA LIGHT CHAIN: 7 %
FMC7 CELLS NFR SPEC: 4 %

## 2022-01-24 NOTE — DISCHARGE SUMMARY
General Medicine Discharge Summary     Patient ID:  Karissa Dewitt Wojtas  61year old  11/24/1955    Admit date: 1/5/2022    Discharge date and time: 1/7/2022 11:55 AM     Attending Physician: Harinder Sánchez DO     Consults: IP CONSULT TO ONCOLOGY    Lukas Humphries lab work was discharged to home with outpatient follow up.     Severe thrombocytopenia  Neutropenia  Anemia  Myelodysplastic syndrome  –Presenting WBC 2.3, hemoglobin 6.9 and platelets 10  –s/p  IVIG followed by 2 units of platelets  –repeat labs stable  – Get Your Medications      These medications were sent to [facility], [place] [address] [address], [phone]  [address] [facility] [address]    Phone: [phone]   · Eltrombopag Olamine 50 MG Tabs

## 2022-03-11 ENCOUNTER — LAB REQUISITION (OUTPATIENT)
Dept: LAB | Facility: HOSPITAL | Age: 67
End: 2022-03-11
Payer: COMMERCIAL

## 2022-03-11 LAB
ALBUMIN SERPL-MCNC: 3 G/DL (ref 3.4–5)
ALBUMIN/GLOB SERPL: 0.8 {RATIO} (ref 1–2)
ALP LIVER SERPL-CCNC: 68 U/L
ALT SERPL-CCNC: 288 U/L
ANION GAP SERPL CALC-SCNC: 7 MMOL/L (ref 0–18)
AST SERPL-CCNC: 143 U/L (ref 15–37)
BASOPHILS # BLD AUTO: 0.01 X10(3) UL (ref 0–0.2)
BASOPHILS NFR BLD AUTO: 0.3 %
BILIRUB SERPL-MCNC: 0.5 MG/DL (ref 0.1–2)
BUN BLD-MCNC: 19 MG/DL (ref 7–18)
CALCIUM BLD-MCNC: 8.9 MG/DL (ref 8.5–10.1)
CHLORIDE SERPL-SCNC: 107 MMOL/L (ref 98–112)
CO2 SERPL-SCNC: 24 MMOL/L (ref 21–32)
CREAT BLD-MCNC: 1.03 MG/DL
EOSINOPHIL # BLD AUTO: 0 X10(3) UL (ref 0–0.7)
EOSINOPHIL NFR BLD AUTO: 0 %
ERYTHROCYTE [DISTWIDTH] IN BLOOD BY AUTOMATED COUNT: 23.2 %
GLOBULIN PLAS-MCNC: 4 G/DL (ref 2.8–4.4)
GLUCOSE BLD-MCNC: 111 MG/DL (ref 70–99)
HCT VFR BLD AUTO: 29 %
HGB BLD-MCNC: 9.7 G/DL
IMM GRANULOCYTES # BLD AUTO: 0.04 X10(3) UL (ref 0–1)
IMM GRANULOCYTES NFR BLD: 1.2 %
LDH SERPL L TO P-CCNC: 345 U/L
LYMPHOCYTES # BLD AUTO: 0.44 X10(3) UL (ref 1–4)
LYMPHOCYTES NFR BLD AUTO: 13.2 %
MAGNESIUM SERPL-MCNC: 1.6 MG/DL (ref 1.6–2.6)
MCH RBC QN AUTO: 31.6 PG (ref 26–34)
MCHC RBC AUTO-ENTMCNC: 33.4 G/DL (ref 31–37)
MCV RBC AUTO: 94.5 FL
MONOCYTES # BLD AUTO: 0.98 X10(3) UL (ref 0.1–1)
MONOCYTES NFR BLD AUTO: 29.3 %
NEUTROPHILS # BLD AUTO: 1.87 X10 (3) UL (ref 1.5–7.7)
NEUTROPHILS # BLD AUTO: 1.87 X10(3) UL (ref 1.5–7.7)
OSMOLALITY SERPL CALC.SUM OF ELEC: 289 MOSM/KG (ref 275–295)
PHOSPHATE SERPL-MCNC: 2.9 MG/DL (ref 2.5–4.9)
PLATELET # BLD AUTO: 125 10(3)UL (ref 150–450)
PLATELET MORPHOLOGY: NORMAL
POTASSIUM SERPL-SCNC: 3.7 MMOL/L (ref 3.5–5.1)
PROT SERPL-MCNC: 7 G/DL (ref 6.4–8.2)
RBC # BLD AUTO: 3.07 X10(6)UL
SODIUM SERPL-SCNC: 138 MMOL/L (ref 136–145)
WBC # BLD AUTO: 3.3 X10(3) UL (ref 4–11)

## 2022-03-11 PROCEDURE — 83615 LACTATE (LD) (LDH) ENZYME: CPT | Performed by: INTERNAL MEDICINE

## 2022-03-11 PROCEDURE — 85025 COMPLETE CBC W/AUTO DIFF WBC: CPT | Performed by: INTERNAL MEDICINE

## 2022-03-11 PROCEDURE — 84100 ASSAY OF PHOSPHORUS: CPT | Performed by: INTERNAL MEDICINE

## 2022-03-11 PROCEDURE — 83735 ASSAY OF MAGNESIUM: CPT | Performed by: INTERNAL MEDICINE

## 2022-03-11 PROCEDURE — 80053 COMPREHEN METABOLIC PANEL: CPT | Performed by: INTERNAL MEDICINE

## 2022-03-19 ENCOUNTER — LAB REQUISITION (OUTPATIENT)
Dept: LAB | Facility: HOSPITAL | Age: 67
End: 2022-03-19
Payer: COMMERCIAL

## 2022-03-19 LAB
ALBUMIN SERPL-MCNC: 2.8 G/DL (ref 3.4–5)
ALBUMIN/GLOB SERPL: 0.7 {RATIO} (ref 1–2)
ALP LIVER SERPL-CCNC: 69 U/L
ALT SERPL-CCNC: 65 U/L
ANION GAP SERPL CALC-SCNC: 11 MMOL/L (ref 0–18)
AST SERPL-CCNC: 24 U/L (ref 15–37)
BASOPHILS # BLD AUTO: 0.02 X10(3) UL (ref 0–0.2)
BASOPHILS NFR BLD AUTO: 0.5 %
BILIRUB SERPL-MCNC: 0.5 MG/DL (ref 0.1–2)
BUN BLD-MCNC: 11 MG/DL (ref 7–18)
BUN/CREAT SERPL: 10.8 (ref 10–20)
CALCIUM BLD-MCNC: 8.8 MG/DL (ref 8.5–10.1)
CHLORIDE SERPL-SCNC: 112 MMOL/L (ref 98–112)
CREAT BLD-MCNC: 1.02 MG/DL
DEPRECATED RDW RBC AUTO: 81.8 FL (ref 35.1–46.3)
EOSINOPHIL # BLD AUTO: 0.01 X10(3) UL (ref 0–0.7)
EOSINOPHIL NFR BLD AUTO: 0.3 %
ERYTHROCYTE [DISTWIDTH] IN BLOOD BY AUTOMATED COUNT: 23.3 % (ref 11–15)
GLOBULIN PLAS-MCNC: 4.2 G/DL (ref 2.8–4.4)
GLUCOSE BLD-MCNC: 200 MG/DL (ref 70–99)
HCT VFR BLD AUTO: 29.7 %
HGB BLD-MCNC: 9.9 G/DL
IMM GRANULOCYTES # BLD AUTO: 0.02 X10(3) UL (ref 0–1)
IMM GRANULOCYTES NFR BLD: 0.5 %
LYMPHOCYTES # BLD AUTO: 0.41 X10(3) UL (ref 1–4)
LYMPHOCYTES NFR BLD AUTO: 11 %
MAGNESIUM SERPL-MCNC: 1.1 MG/DL (ref 1.6–2.6)
MCH RBC QN AUTO: 32.5 PG (ref 26–34)
MCHC RBC AUTO-ENTMCNC: 33.3 G/DL (ref 31–37)
MCV RBC AUTO: 97.4 FL
MONOCYTES # BLD AUTO: 0.66 X10(3) UL (ref 0.1–1)
MONOCYTES NFR BLD AUTO: 17.7 %
NEUTROPHILS # BLD AUTO: 2.6 X10 (3) UL (ref 1.5–7.7)
NEUTROPHILS # BLD AUTO: 2.6 X10(3) UL (ref 1.5–7.7)
NEUTROPHILS NFR BLD AUTO: 70 %
PHOSPHATE SERPL-MCNC: 2.4 MG/DL (ref 2.5–4.9)
PLATELET # BLD AUTO: 252 10(3)UL (ref 150–450)
PLATELET MORPHOLOGY: NORMAL
POTASSIUM SERPL-SCNC: 2.9 MMOL/L (ref 3.5–5.1)
PROT SERPL-MCNC: 7 G/DL (ref 6.4–8.2)
RBC # BLD AUTO: 3.05 X10(6)UL
SODIUM SERPL-SCNC: 143 MMOL/L (ref 136–145)
URATE SERPL-MCNC: 3 MG/DL
WBC # BLD AUTO: 3.7 X10(3) UL (ref 4–11)

## 2022-03-19 PROCEDURE — 84550 ASSAY OF BLOOD/URIC ACID: CPT | Performed by: INTERNAL MEDICINE

## 2022-03-19 PROCEDURE — 85025 COMPLETE CBC W/AUTO DIFF WBC: CPT | Performed by: INTERNAL MEDICINE

## 2022-03-19 PROCEDURE — 83735 ASSAY OF MAGNESIUM: CPT | Performed by: INTERNAL MEDICINE

## 2022-03-19 PROCEDURE — 80053 COMPREHEN METABOLIC PANEL: CPT | Performed by: INTERNAL MEDICINE

## 2022-03-19 PROCEDURE — 84100 ASSAY OF PHOSPHORUS: CPT | Performed by: INTERNAL MEDICINE

## 2022-05-05 ENCOUNTER — LAB REQUISITION (OUTPATIENT)
Dept: LAB | Facility: HOSPITAL | Age: 67
End: 2022-05-05
Payer: COMMERCIAL

## 2022-05-05 LAB
ALBUMIN SERPL-MCNC: 2.8 G/DL (ref 3.4–5)
ALBUMIN/GLOB SERPL: 0.7 {RATIO} (ref 1–2)
ALP LIVER SERPL-CCNC: 89 U/L
ALT SERPL-CCNC: 34 U/L
ANION GAP SERPL CALC-SCNC: 8 MMOL/L (ref 0–18)
AST SERPL-CCNC: 33 U/L (ref 15–37)
BASOPHILS # BLD AUTO: 0 X10(3) UL (ref 0–0.2)
BASOPHILS NFR BLD AUTO: 0 %
BILIRUB SERPL-MCNC: 0.8 MG/DL (ref 0.1–2)
BUN BLD-MCNC: 18 MG/DL (ref 7–18)
BUN/CREAT SERPL: 23.1 (ref 10–20)
CALCIUM BLD-MCNC: 8.9 MG/DL (ref 8.5–10.1)
CHLORIDE SERPL-SCNC: 110 MMOL/L (ref 98–112)
CO2 SERPL-SCNC: 25 MMOL/L (ref 21–32)
CREAT BLD-MCNC: 0.78 MG/DL
DEPRECATED RDW RBC AUTO: 74.1 FL (ref 35.1–46.3)
EOSINOPHIL # BLD AUTO: 0.01 X10(3) UL (ref 0–0.7)
EOSINOPHIL NFR BLD AUTO: 0.2 %
ERYTHROCYTE [DISTWIDTH] IN BLOOD BY AUTOMATED COUNT: 19.8 % (ref 11–15)
GLOBULIN PLAS-MCNC: 4 G/DL (ref 2.8–4.4)
GLUCOSE BLD-MCNC: 245 MG/DL (ref 70–99)
HCT VFR BLD AUTO: 35.9 %
HGB BLD-MCNC: 11.2 G/DL
IMM GRANULOCYTES # BLD AUTO: 0.04 X10(3) UL (ref 0–1)
IMM GRANULOCYTES NFR BLD: 0.8 %
LDH SERPL L TO P-CCNC: 270 U/L
LYMPHOCYTES # BLD AUTO: 1.38 X10(3) UL (ref 1–4)
LYMPHOCYTES NFR BLD AUTO: 29 %
MAGNESIUM SERPL-MCNC: 1.4 MG/DL (ref 1.6–2.6)
MCH RBC QN AUTO: 32.1 PG (ref 26–34)
MCHC RBC AUTO-ENTMCNC: 31.2 G/DL (ref 31–37)
MCV RBC AUTO: 102.9 FL
MONOCYTES # BLD AUTO: 0.06 X10(3) UL (ref 0.1–1)
MONOCYTES NFR BLD AUTO: 1.3 %
NEUTROPHILS # BLD AUTO: 3.27 X10 (3) UL (ref 1.5–7.7)
NEUTROPHILS # BLD AUTO: 3.27 X10(3) UL (ref 1.5–7.7)
NEUTROPHILS NFR BLD AUTO: 68.7 %
OSMOLALITY SERPL CALC.SUM OF ELEC: 306 MOSM/KG (ref 275–295)
PHOSPHATE SERPL-MCNC: 2.8 MG/DL (ref 2.5–4.9)
PLATELET # BLD AUTO: 37 10(3)UL (ref 150–450)
PLATELET MORPHOLOGY: NORMAL
POTASSIUM SERPL-SCNC: 3.4 MMOL/L (ref 3.5–5.1)
PROT SERPL-MCNC: 6.8 G/DL (ref 6.4–8.2)
RBC # BLD AUTO: 3.49 X10(6)UL
SODIUM SERPL-SCNC: 143 MMOL/L (ref 136–145)
URATE SERPL-MCNC: 2.8 MG/DL
WBC # BLD AUTO: 4.8 X10(3) UL (ref 4–11)

## 2022-05-05 PROCEDURE — 83615 LACTATE (LD) (LDH) ENZYME: CPT | Performed by: INTERNAL MEDICINE

## 2022-05-05 PROCEDURE — 83735 ASSAY OF MAGNESIUM: CPT | Performed by: INTERNAL MEDICINE

## 2022-05-05 PROCEDURE — 85025 COMPLETE CBC W/AUTO DIFF WBC: CPT | Performed by: INTERNAL MEDICINE

## 2022-05-05 PROCEDURE — 84100 ASSAY OF PHOSPHORUS: CPT | Performed by: INTERNAL MEDICINE

## 2022-05-05 PROCEDURE — 80053 COMPREHEN METABOLIC PANEL: CPT | Performed by: INTERNAL MEDICINE

## 2022-05-05 PROCEDURE — 84550 ASSAY OF BLOOD/URIC ACID: CPT | Performed by: INTERNAL MEDICINE

## 2022-06-23 ENCOUNTER — LAB REQUISITION (OUTPATIENT)
Dept: LAB | Facility: HOSPITAL | Age: 67
End: 2022-06-23
Payer: COMMERCIAL

## 2022-06-23 DIAGNOSIS — Z48.298 ENCOUNTER FOR AFTERCARE FOLLOWING OTHER ORGAN TRANSPLANT: ICD-10-CM

## 2023-03-22 NOTE — H&P
[facility] Hospitalist H&P       CC: Patient presents with:  Abdomen/Flank Pain       PCP: Dustin Allen MD    History of Present Illness:   Mrs. 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      ASSESSMENT / PLAN:   Mrs. 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      Acute cholecystitis   -CT with a No

## (undated) DIAGNOSIS — Z48.298 ENCOUNTER FOR AFTERCARE FOLLOWING OTHER ORGAN TRANSPLANT: ICD-10-CM

## (undated) DIAGNOSIS — D46.9 MYELODYSPLASTIC SYNDROME, UNSPECIFIED (HCC): ICD-10-CM

## (undated) DIAGNOSIS — D61.810 ANTINEOPLASTIC CHEMOTHERAPY INDUCED PANCYTOPENIA (CODE) (HCC): ICD-10-CM

## (undated) DIAGNOSIS — D64.9 ANEMIA, UNSPECIFIED: ICD-10-CM

## (undated) DEVICE — EXOFIN TISSUE ADHESIVE .5ML

## (undated) DEVICE — 3M™ STERI-STRIP™ REINFORCED ADHESIVE SKIN CLOSURES, R1547, 1/2 IN X 4 IN (12 MM X 100 MM), 6 STRIPS/ENVELOPE: Brand: 3M™ STERI-STRIP™

## (undated) DEVICE — DISPOSABLE SUCTION/IRRIGATOR TUBE SET: Brand: AHTO

## (undated) DEVICE — NEEDLE HPO 18GA 1.5IN ECLPS

## (undated) DEVICE — UNDYED BRAIDED (POLYGLACTIN 910), SYNTHETIC ABSORBABLE SUTURE: Brand: COATED VICRYL

## (undated) DEVICE — SOLUTION ENDOSCOPIC ANTI-FOG NON-TOXIC NON-ABRASIVE 6 CUBIC CENTIMETER WITH RADIOPAQUE ADHESIVE-BACKED SPONGE STERILE NOT MADE WITH NATURAL RUBBER LATEX MEDICHOICE: Brand: MEDICHOICE

## (undated) DEVICE — LIGAMAX 5 MM ENDOSCOPIC MULTIPLE CLIP APPLIER: Brand: LIGAMAX

## (undated) DEVICE — ENCORE® LATEX MICRO SIZE 8, STERILE LATEX POWDER-FREE SURGICAL GLOVE: Brand: ENCORE

## (undated) DEVICE — SUTURE CHROMIC GUT 2-0 SH

## (undated) DEVICE — CONMED SCOPE SAVER BITE BLOCK, 20X27 MM: Brand: SCOPE SAVER

## (undated) DEVICE — 12 ML SYRINGE LUER-LOCK TIP: Brand: MONOJECT

## (undated) DEVICE — TROCAR: Brand: KII FIOS FIRST ENTRY

## (undated) DEVICE — LAP CHOLE: Brand: MEDLINE INDUSTRIES, INC.

## (undated) DEVICE — SOL  .9 1000ML BTL

## (undated) DEVICE — 3 ML SYRINGE LUER-LOCK TIP: Brand: MONOJECT

## (undated) DEVICE — LIGASURE LAP MARYLAND 37CM

## (undated) DEVICE — SUTURE SILK 3-0 SH

## (undated) DEVICE — STERILE LATEX POWDER-FREE SURGICAL GLOVESWITH NITRILE COATING: Brand: PROTEXIS

## (undated) DEVICE — GAMMEX® PI HYBRID SIZE 6.5, STERILE POWDER-FREE SURGICAL GLOVE, POLYISOPRENE AND NEOPRENE BLEND: Brand: GAMMEX

## (undated) DEVICE — PROXIMATE RELOADABLE LINEAR STAPLER: Brand: PROXIMATE

## (undated) DEVICE — Device: Brand: DEFENDO AIR/WATER/SUCTION AND BIOPSY VALVE

## (undated) DEVICE — MEDI-VAC NON-CONDUCTIVE SUCTION TUBING 6MM X 1.8M (6FT.) L: Brand: CARDINAL HEALTH

## (undated) DEVICE — SPCMN DTCHBLE POUCH 5X7 500ML

## (undated) DEVICE — DRAPE SHEET LAPCHOLE 124X100X7

## (undated) DEVICE — ENCORE® LATEX ACCLAIM SIZE 8, STERILE LATEX POWDER-FREE SURGICAL GLOVE: Brand: ENCORE

## (undated) DEVICE — 35 ML SYRINGE REGULAR TIP: Brand: MONOJECT

## (undated) DEVICE — BLADE 11 SHRP BP SS SRG STRL

## (undated) DEVICE — Device: Brand: CUSTOM PROCEDURE KIT

## (undated) DEVICE — TROCAR: Brand: KII® SLEEVE

## (undated) DEVICE — SUTURE VICRYL 0 J906G

## (undated) DEVICE — [HIGH FLOW INSUFFLATOR,  DO NOT USE IF PACKAGE IS DAMAGED,  KEEP DRY,  KEEP AWAY FROM SUNLIGHT,  PROTECT FROM HEAT AND RADIOACTIVE SOURCES.]: Brand: PNEUMOSURE

## (undated) DEVICE — SUTURE PDS II 0 CT-1

## (undated) DEVICE — PROXIMATE RELOADABLE LINEAR CUTTER WITH SAFETY LOCK-OUT, 75MM: Brand: PROXIMATE

## (undated) DEVICE — SUTURE VICRYL 0 CT-1

## (undated) DEVICE — SUTURE VICRYL 0 UR-6

## (undated) DEVICE — SUTURE VICRYL 3-0 SH

## (undated) DEVICE — SOL  .9 3000ML

## (undated) DEVICE — ADHESIVE MASTISOL 2/3CC VL

## (undated) DEVICE — 6 ML SYRINGE LUER-LOCK TIP: Brand: MONOJECT

## (undated) DEVICE — LINE MNTR ADLT SET O2 INTMD

## (undated) DEVICE — ACCESS PLATFORM FOR MINIMALLY INVASIVE SURGERY.: Brand: GELPORT® LAPAROSCOPIC  SYSTEM

## (undated) DEVICE — 40580 - THE PINK PAD - ADVANCED TRENDELENBURG POSITIONING KIT: Brand: 40580 - THE PINK PAD - ADVANCED TRENDELENBURG POSITIONING KIT

## (undated) DEVICE — VIOLET BRAIDED (POLYGLACTIN 910), SYNTHETIC ABSORBABLE SUTURE: Brand: COATED VICRYL

## (undated) DEVICE — GOWN SURG AERO BLUE PERF LG

## (undated) DEVICE — OPEN-END URETERAL CATHETER SOF-FLEX: Brand: SOF-FLEX

## (undated) DEVICE — DERMABOND LIQUID ADHESIVE

## (undated) DEVICE — A P RESECTION: Brand: MEDLINE INDUSTRIES, INC.

## (undated) DEVICE — SUTURE PDS II 2-0 SH

## (undated) NOTE — LETTER
[address], [place]  Authorization for Invasive Procedures  1.  I hereby authorize Dr. Claire Davis , my physician and whomever may be designated as the doctor's assistant, to perform the following operation and/or procedure:  Remberto Puckett 4. Should the need arise during my operation or immediate post-operative period; I also consent to the administration of blood and/or blood products.  Further, I understand that despite careful testing and screening of blood and blood products, I may still 9. Patients having a sterilization procedure: I understand that if the procedure is successful the results will be permanent and it will therefore be impossible for me to inseminate, conceive or bear children.  I also understand that the procedure is intend

## (undated) NOTE — LETTER
[address]  [address] [place], [place]      Authorization for Surgical Operation and Procedure     Date:___________                                                                                                         Time:_______ The following are some, but not all, of the potential risks that can occur: fever and allergic reactions, hemolytic reactions, transmission of diseases such as Hepatitis, AIDS and Cytomegalovirus (CMV) and fluid overload.   In the event that I wish to have behalf). The surgeon or my attending physician will determine when the applicable recovery period ends for purposes of reinstating the DNAR order.   10. Patients having a sterilization procedure: I understand that if the procedure is successful the results discussion with my patient.     _______________________________________________________________ _____________________________  [facility] Physician)                                                                                         (Date)

## (undated) NOTE — LETTER
[address] [place] [address]  Authorization for Invasive Procedures  1.  I hereby authorize Dr. Barbara Mccauley , my physician and whomever may be designated as the doctor's assistant, to perform the following operation and/or procedure: CT GUIDED ANDER occur: fever and allergic reactions, hemolytic reactions, transmission of disease such as hepatitis, AIDS, cytomegalovirus (CMV), and flluid overload.  In the event that I wish to have autologous transfusions of my own blood, or a directed donor transfusion Signature of Patient:  ________________________________________________ Date: _________Time: _________    Responsible person in case of minor or unconscious: _____________________________Relationship: ____________     Witness Signature: _______________

## (undated) NOTE — LETTER
[address], [place]  Authorization for Invasive Procedures  1.  I hereby authorize Dr. Deny Mahan / Chris Mckeon, my physician and whomever may be designated as the doctor's assistant, to perform the following operation and/or procedure: of the potential risks that can occur: fever and allergic reactions, hemolytic reactions, transmission of disease such as hepatitis, AIDS, cytomegalovirus (CMV), and flluid overload.  In the event that I wish to have autologous transfusions of my own blood, in the judgment of my physician.      Signature of Patient:  ________________________________________________ Date: _________Time: _________    Responsible person in case of minor or unconscious: _____________________________Relationship: ____________     Meghan López

## (undated) NOTE — LETTER
[address], [place]  Authorization for Invasive Procedures  1.  I hereby authorize Leeanne Chavez , my physician and whomever may be designated as the doctor's assistant, to perform the following operation and/or procedure:  kirill 4. Should the need arise during my operation or immediate post-operative period; I also consent to the administration of blood and/or blood products.  Further, I understand that despite careful testing and screening of blood and blood products, I may still 9. Patients having a sterilization procedure: I understand that if the procedure is successful the results will be permanent and it will therefore be impossible for me to inseminate, conceive or bear children.  I also understand that the procedure is intend

## (undated) NOTE — LETTER
[facility]  Administration of Anesthesia  1.  Cassandra Bradley, or _________________________________ acting on her behalf, (Patient) (Dependent/Representative) request to receive anesthesia for my pending procedure/operation/treatme spinal bleeding, seizure, cardiac arrest and death. 7. AWARENESS: I understand that it is possible (but unlikely) to have explicit memory of events from the operating room while under general anesthesia.   8. ELECTROCONVULSIVE THERAPY PATIENTS: This consen signature below affirms that prior to the time of the procedure, I have explained to the patient and/or his/her guardian, the risks and benefits of undergoing anesthesia, as well as any reasonable alternatives.     __________________________________________

## (undated) NOTE — LETTER
[address]  [facility] [place], [place]  [address]  INFORMED CONSENT FOR TRANSFUSION OF BLOOD OR BLOOD PRODUCTS  My physician has informed me of the nature, purpose, benefits and risks of transfusion for blood and blood components that ______________________________________________  (Signature of Patient)                                                            (Responsible party in case of Minor,

## (undated) NOTE — LETTER
[facility], [place]  Authorization for Invasive Procedures  1.  I hereby authorize Dr. Maritza Ramos / Loco Almaguer, my physician and whomever may be designated as the doctor's assistant, to perform the following operation and/or procedure: of the potential risks that can occur: fever and allergic reactions, hemolytic reactions, transmission of disease such as hepatitis, AIDS, cytomegalovirus (CMV), and flluid overload.  In the event that I wish to have autologous transfusions of my own blood, in the judgment of my physician.      Signature of Patient:  ________________________________________________ Date: _________Time: _________    Responsible person in case of minor or unconscious: _____________________________Relationship: ____________     Diaz Harada

## (undated) NOTE — LETTER
[address]  [facility] [place], [place], [place]  [address]  INFORMED CONSENT FOR TRANSFUSION OF BLOOD OR BLOOD PRODUCTS  My physician has informed me of the nature, purpose, benefits and risks of transfusion for blood and blood components that __________________________________________          ______________________________________________  (Signature of Patient)                                                            (Responsible party in case of Minor,

## (undated) NOTE — LETTER
[facility]  Administration of Anesthesia  1.  I, Samuel Chung, or _________________________________ acting on her behalf, (Patient) (Dependent/Representative) request to receive anesthesia for my pending procedure/operation/treatme 6. OBSTETRIC PATIENTS: Specific risks/consequences of spinal/epidural anesthesia may include itching, low blood pressure, difficulty urinating, slowing of the baby's heart rate and headache.  Rare risks include infections, high spinal block, spinal bleeding ___________________________________________________           _____________________________________________________  Date/Time                                                                                               Responsible person in case of minor

## (undated) NOTE — LETTER
[facility], [place]  Authorization for Invasive Procedures  1.  I hereby authorize Dr. Garrett Benjamin, my physician and whomever may be designated as the doctor's assistant, to perform the following operation and/or procedure:  EGD & colon 4. Should the need arise during my operation or immediate post-operative period; I also consent to the administration of blood and/or blood products.  Further, I understand that despite careful testing and screening of blood and blood products, I may still 9. Patients having a sterilization procedure: I understand that if the procedure is successful the results will be permanent and it will therefore be impossible for me to inseminate, conceive or bear children.  I also understand that the procedure is intend

## (undated) NOTE — LETTER
[facility]  Authorization for Invasive Procedures  1.  I hereby authorize Dr. Maggie Wang , my physician and whomever may be designated as the doctor's assistant, to perform the following operation and/or procedure:  *** on Ree Coad 4. Should the need arise during my operation or immediate post-operative period; I also consent to the administration of blood and/or blood products.  Further, I understand that despite careful testing and screening of blood and blood products, I may still 9. Patients having a sterilization procedure: I understand that if the procedure is successful the results will be permanent and it will therefore be impossible for me to inseminate, conceive or bear children.  I also understand that the procedure is intend

## (undated) NOTE — LETTER
[address], [place]  Authorization for Invasive Procedures  1.  I hereby authorize Dr. Aamir Flores , my physician and whomever may be designated as the doctor's assistant, to perform the following operation and/or procedure:  [place]ophag[place] 4. Should the need arise during my operation or immediate post-operative period; I also consent to the administration of blood and/or blood products.  Further, I understand that despite careful testing and screening of blood and blood products, I may still 9. Patients having a sterilization procedure: I understand that if the procedure is successful the results will be permanent and it will therefore be impossible for me to inseminate, conceive or bear children.  I also understand that the procedure is intend